# Patient Record
Sex: FEMALE | Race: WHITE | NOT HISPANIC OR LATINO | Employment: FULL TIME | URBAN - METROPOLITAN AREA
[De-identification: names, ages, dates, MRNs, and addresses within clinical notes are randomized per-mention and may not be internally consistent; named-entity substitution may affect disease eponyms.]

---

## 2017-03-27 ENCOUNTER — HOSPITAL ENCOUNTER (OUTPATIENT)
Dept: RADIOLOGY | Age: 67
Discharge: HOME | End: 2017-03-27
Payer: COMMERCIAL

## 2017-03-27 ENCOUNTER — GENERIC CONVERSION - ENCOUNTER (OUTPATIENT)
Dept: OTHER | Facility: OTHER | Age: 67
End: 2017-03-27

## 2017-03-27 DIAGNOSIS — Z13.820 ENCOUNTER FOR SCREENING FOR OSTEOPOROSIS: ICD-10-CM

## 2017-03-27 DIAGNOSIS — M81.0 AGE-RELATED OSTEOPOROSIS WITHOUT CURRENT PATHOLOGICAL FRACTURE: ICD-10-CM

## 2017-03-27 PROCEDURE — 77080 DXA BONE DENSITY AXIAL: CPT

## 2017-12-18 ENCOUNTER — ALLSCRIPTS OFFICE VISIT (OUTPATIENT)
Dept: OTHER | Facility: OTHER | Age: 67
End: 2017-12-18

## 2017-12-18 DIAGNOSIS — R63.4 ABNORMAL WEIGHT LOSS: ICD-10-CM

## 2017-12-19 NOTE — PROGRESS NOTES
Assessment  1  Acute bronchitis (466 0) (J20 9)   2  Abnormal weight loss (783 21) (R63 4)    Plan  Abnormal weight loss    · US PELVIS COMPLETE NON OB; Status:Hold For - Scheduling; Requestedfor:18Sck3665;   Acute bronchitis    · Azithromycin 250 MG Oral Tablet; TAKE 2 TABLETS ON DAY 1 THEN TAKE 1TABLET A DAY FOR 4 DAYS   · Dulera 100-5 MCG/ACT Inhalation Aerosol; INHALE 2 PUFFS AT 12 HOURINTERVALS (MORNING AND EVENING)   · Recheck if symptoms do not improve in 4-7 days Evaluation and Treatment  Follow-up Status: Complete  Done: 13LSF4044 12:28PM    Discussion/Summary  The patient was counseled regarding risk factor reductions,-- impressions  Possible side effects of new medications were reviewed with the patient/guardian today  The treatment plan was reviewed with the patient/guardian  The patient/guardian understands and agrees with the treatment plan      Chief Complaint  Coughing and congestion      History of Present Illness  HPI: Patient is here for symptoms of upper respiratory infection  she is also reporting weight loss  Patient states that she is not trying to lose weight, And has been eating healthy  her last Pap at 73 was reported normal  Denies any symptoms of postmenopausal spotting /bleeding / vaginal discharge  Reports mild lower abdomen bloating  Patient had a colonoscopy 4 years ago and was told that she has a polyp  Patient is not due for another colonoscopy till next year  She is up-to-date on her mammogram, And has not noticed any breast lumps  Sore Throat:   Jia Resenidz presents with complaints of sudden onset of constant episodes of moderate bilateral sore throat, described as dull, non-radiating  Episodes started 12 days ago  Symptoms are worsening  Associated symptoms include nasal congestion,-- postnasal drainage,-- headache,-- hoarseness-- and-- cough, but-- no fever,-- no chills-- and-- no ear pain  Review of Systems   Constitutional: as noted in HPI   ENT: as noted in HPI  Cardiovascular: no complaints of slow or fast heart rate, no chest pain, no palpitations, no leg claudication or lower extremity edema  Respiratory: as noted in HPI  Gastrointestinal: as noted in HPI  Active Problems  1  Age related osteoporosis (733 01) (M81 0)   2  Dizziness (780 4) (R42)   3  Dizziness and giddiness (780 4) (R42)   4  Encounter for screening for cardiovascular disorders (V81 2) (Z13 6)   5  Hypervitaminosis D (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 4)   6  Iron deficiency anemia (280 9) (D50 9)   7  Need for pneumococcal vaccination (V03 82) (Z23)   8  Osteoarthritis Of Hand (715 94)   9  Osteoporosis screening (V82 81) (Z13 820)   10  Visit for screening mammogram (V76 12) (Z12 31)    Past Medical History  1  History of De Quervain's tenosynovitis (727 04) (M65 4)   2  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)   3  History of Retinal Tear Of The Right Eye (361 30)  Active Problems And Past Medical History Reviewed: The active problems and past medical history were reviewed and updated today  Family History  Mother    1  Family history of Chronic Obstructive Pulmonary Disease   2  Family history of Coronary Artery Disease (V17 49)   3  Family history of Hypertension (V17 49)   4  Family history of Osteoporosis (V17 81)  Father    5  Family history of Emphysema  Brother    6  Family history of Antithrombin III Deficiency   7  Family history of Diverticulitis Of Colon   8  Family history of Hypertension (V17 49)    Social History   · Marital History -    · Never A Smoker  The social history was reviewed and updated today  Surgical History  1  History of Repair Of Retinal Detachment   2  History of Tonsillectomy   3  History of Tubal Ligation    Current Meds   1  Ferrous Sulfate 325 (65 Fe) MG Oral Tablet; one tablet twice / week; Therapy: 27EQI2768 to (Last Rx:53Ctm2376) Ordered    The medication list was reviewed and updated today  Allergies  1   Penicillins    Vitals   Recorded: 21QYK8501 10: 32AM   Temperature 98 1 F   Heart Rate 57   Respiration 16   Systolic 342   Diastolic 62   Height 5 ft 4 in   Weight 118 lb    BMI Calculated 20 25   BSA Calculated 1 56   O2 Saturation 97     Physical Exam   Constitutional  General appearance: No acute distress, well appearing and well nourished  Ears, Nose, Mouth, and Throat  Otoscopic examination: Tympanic membranes translucent with normal light reflex  Canals patent without erythema  Oropharynx: Abnormal   The posterior pharynx was erythematous  Pulmonary  Auscultation of lungs: Abnormal   bronchial breath sounds over the left base  Cardiovascular  Auscultation of heart: Normal rate and rhythm, normal S1 and S2, without murmurs  Examination of extremities for edema and/or varicosities: Normal    Abdomen  Abdomen: Non-tender, no masses  Liver and spleen: No hepatomegaly or splenomegaly  Results/Data  Falls Risk Assessment (Dx Z13 89 Screen for Neurologic Disorder) 38ABE4551 10:36AM User, [employer]     Test Name Result Flag Reference   Falls Risk      No falls in the past year     PHQ-2 Adult Depression Screening 73Fwu7910 10:36AM User, [employer]     Test Name Result Flag Reference   PHQ-2 Adult Depression Score 0     Over the last two weeks, how often have you been bothered by any of the following problems?  Little interest or pleasure in doing things: Not at all - 0 Feeling down, depressed, or hopeless: Not at all - 0   PHQ-2 Adult Depression Screening Negative         Signatures   Electronically signed by : Dora Francis MD; Dec 18 2017 12:29PM EST                       (Author)

## 2017-12-21 ENCOUNTER — HOSPITAL ENCOUNTER (OUTPATIENT)
Dept: ULTRASOUND IMAGING | Facility: HOSPITAL | Age: 67
Discharge: HOME | End: 2017-12-21
Payer: COMMERCIAL

## 2017-12-21 DIAGNOSIS — R63.4 ABNORMAL WEIGHT LOSS: ICD-10-CM

## 2017-12-21 PROCEDURE — 76830 TRANSVAGINAL US NON-OB: CPT

## 2017-12-21 PROCEDURE — 76856 US EXAM PELVIC COMPLETE: CPT

## 2017-12-26 ENCOUNTER — GENERIC CONVERSION - ENCOUNTER (OUTPATIENT)
Dept: OTHER | Facility: OTHER | Age: 67
End: 2017-12-26

## 2018-01-11 NOTE — RESULT NOTES
Verified Results  (Q) TSH, 3RD GENERATION 58Nfd4518 06:37AM Renee Delarosa   REPORT COMMENT:  FASTING:YES     Test Name Result Flag Reference   TSH 2 58 mIU/L  0 40-4 50     (1) LIPID PANEL, FASTING 27Wsv7655 06:37AM Renee Delarosa     Test Name Result Flag Reference   CHOLESTEROL, TOTAL 204 mg/dL H 125-200   HDL CHOLESTEROL 107 mg/dL  > OR = 46   TRIGLICERIDES 67 mg/dL  <021   LDL-CHOLESTEROL 84 mg/dL (calc)  <130   Desirable range <100 mg/dL for patients with CHD or  diabetes and <70 mg/dL for diabetic patients with  known heart disease  CHOL/HDLC RATIO 1 9 (calc)  < OR = 5 0   NON HDL CHOLESTEROL 97 mg/dL (calc)     Target for non-HDL cholesterol is 30 mg/dL higher than   LDL cholesterol target  (1) COMPREHENSIVE METABOLIC PANEL 86ITD1622 17:18RA Renee Delarosa     Test Name Result Flag Reference   GLUCOSE 83 mg/dL  65-99   Fasting reference interval   UREA NITROGEN (BUN) 14 mg/dL  7-25   CREATININE 0 81 mg/dL  0 50-0 99   For patients >52years of age, the reference limit  for Creatinine is approximately 13% higher for people  identified as -American  eGFR NON-AFR   AMERICAN 76 mL/min/1 73m2  > OR = 60   eGFR AFRICAN AMERICAN 88 mL/min/1 73m2  > OR = 60   BUN/CREATININE RATIO   4-29   NOT APPLICABLE (calc)   SODIUM 138 mmol/L  135-146   POTASSIUM 3 9 mmol/L  3 5-5 3   CHLORIDE 101 mmol/L     CARBON DIOXIDE 26 mmol/L  20-31   CALCIUM 9 5 mg/dL  8 6-10 4   PROTEIN, TOTAL 6 4 g/dL  6 1-8 1   ALBUMIN 4 6 g/dL  3 6-5 1   GLOBULIN 1 8 g/dL (calc) L 1 9-3 7   ALBUMIN/GLOBULIN RATIO 2 6 (calc) H 1 0-2 5   BILIRUBIN, TOTAL 1 2 mg/dL  0 2-1 2   ALKALINE PHOSPHATASE 75 U/L     AST 22 U/L  10-35   ALT 19 U/L  6-29     (1) CBC/PLT/DIFF 02YLC1319 06:37AM Renee Delarosa     Test Name Result Flag Reference   WHITE BLOOD CELL COUNT 4 5 Thousand/uL  3 8-10 8   RED BLOOD CELL COUNT 4 88 Million/uL  3 80-5 10   HEMOGLOBIN 13 9 g/dL  11 7-15 5   HEMATOCRIT 41 7 %  35 0-45 0   MCV 85 5 fL  80 0-100 0 MCH 28 6 pg  27 0-33 0   MCHC 33 4 g/dL  32 0-36 0   RDW 14 6 %  11 0-15 0   PLATELET COUNT 487 Thousand/uL  140-400   MPV 9 0 fL  7 5-11 5   ABSOLUTE NEUTROPHILS 2651 cells/uL  8549-4878   ABSOLUTE LYMPHOCYTES 1517 cells/uL  850-3900   ABSOLUTE MONOCYTES 216 cells/uL  200-950   ABSOLUTE EOSINOPHILS 104 cells/uL     ABSOLUTE BASOPHILS 14 cells/uL  0-200   NEUTROPHILS 58 9 %     LYMPHOCYTES 33 7 %     MONOCYTES 4 8 %     EOSINOPHILS 2 3 %     BASOPHILS 0 3 %

## 2018-01-14 NOTE — PROGRESS NOTES
Assessment    1  Encounter for preventive health examination (V70 0) (Z00 00)   2  Visit for screening mammogram (V76 12) (Z12 31)   3  Osteoporosis screening (V82 81) (Z13 820)   4  Need for pneumococcal vaccination (V03 82) (Z23)   5  Iron deficiency anemia (280 9) (D50 9)   6  Encounter for screening for cardiovascular disorders (V81 2) (Z13 6)    Plan  Age related osteoporosis, Osteoporosis screening    · * DXA BONE DENSITY SPINE HIP AND PELVIS; Status:Hold For - Scheduling;  Requested for:68Dcr7474;   Encounter for screening for cardiovascular disorders    · (1) LIPID PANEL, FASTING; Status:Active; Requested for:23Nka4391; Health Maintenance    · We encourage all of our patients to exercise regularly  30 minutes of exercise or physical  activity five or more days a week is recommended for children and adults ;  Status:Complete;   Done: 75FGO1694 01:46PM   · We recommend that you create an advance directive ; Status:Complete;   Done:  50PDW5175 01:46PM   · We recommend that you follow the "Mediterranean diet "; Status:Complete;   Done:  58UDG8367 01:46PM  Health Maintenance, Iron deficiency anemia    · (1) COMPREHENSIVE METABOLIC PANEL; Status:Active; Requested for:18Dtu9429;    · (Q) TSH, 3RD GENERATION; Status:Active; Requested for:83Npu2412;   Iron deficiency anemia    · Ferrous Sulfate 325 (65 Fe) MG Oral Tablet; one tablet twice / week   · (1) CBC/PLT/DIFF; Status:Active; Requested for:70Yoe4542;   Need for pneumococcal vaccination    · Prevnar 13 Intramuscular Suspension; as directed; To Be Done: 88PII7619  Visit for screening mammogram    · * MAMMO SCREENING BILATERAL W CAD; Status:Hold For - Scheduling; Requested  for:53Cak4252;     Discussion/Summary  health maintenance visit Currently, she eats a healthy diet and has an adequate exercise regimen   the risks and benefits of cervical cancer screening were discussed cervical cancer screening is current cervical cancer screening is needed every three years cervical cancer screening is managed by [facility] Breast cancer screening: the risks and benefits of breast cancer screening were discussed, mammogram has been ordered and breast cancer screening is managed by [facility]  Colorectal cancer screening: the risks and benefits of colorectal cancer screening were discussed, colorectal cancer screening is current and colorectal cancer screening is managed by Dr Young Doctor  The risks and benefits of immunizations were discussed and immunizations will be given as outlined in the orders  Advice and education were given regarding nutrition, aerobic exercise, calcium supplements and cardiovascular risk reduction  Patient advised basic labs to follow-up on her state of iron deficiency anemia  She will follow-up x 1 year/ sooner if labs are abnormal    The patient was counseled regarding instructions for management, risk factor reductions, patient and family education, impressions, importance of compliance with treatment  Chief Complaint  Well visit      History of Present Illness  HM, Adult Female: The patient is being seen for a health maintenance evaluation  The last health maintenance visit was 3 year(s) ago  General Health: The patient's health since the last visit is described as good  She has regular dental visits  She denies vision problems  She denies hearing loss  Lifestyle:  She consumes a diverse and healthy diet  She exercises regularly  She does not use tobacco  She denies alcohol use  Reproductive health: the patient is postmenopausal   15 yrs  Screening: Cervical cancer screening includes a pap smear performed 3 and Dr Agustin Rodgers, [facility]  Breast cancer screening includes a mammogram performed 2 YRS AGO  Colorectal cancer screening includes a colonoscopy performed within the past ten years and Dr Hector Hendricks,    metabolic screening reviewed and current     HPI: Patient is here for annual physical    She is currently on over-the-counter oral iron supplements for anemia which was diagnosed with her routine labs when she went to donate blood  Review of Systems    Constitutional: as noted in HPI  Eyes: No complaints of eye pain, no red eyes, no eyesight problems, no discharge, no dry eyes, no itching of eyes  ENT: no complaints of earache, no loss of hearing, no nose bleeds, no nasal discharge, no sore throat, no hoarseness  Cardiovascular: No complaints of slow heart rate, no fast heart rate, no chest pain, no palpitations, no leg claudication, no lower extremity edema  Respiratory: No complaints of shortness of breath, no wheezing, no cough, no SOB on exertion, no orthopnea, no PND  Gastrointestinal: No complaints of abdominal pain, no constipation, no nausea or vomiting, no diarrhea, no bloody stools  Genitourinary: No complaints of dysuria, no incontinence, no pelvic pain, no dysmenorrhea, no vaginal discharge or bleeding  Musculoskeletal: No complaints of arthralgias, no myalgias, no joint swelling or stiffness, no limb pain or swelling  Integumentary: No complaints of skin rash or lesions, no itching, no skin wounds, no breast pain or lump  Neurological: No complaints of headache, no confusion, no convulsions, no numbness, no dizziness or fainting, no tingling, no limb weakness, no difficulty walking  Psychiatric: Not suicidal, no sleep disturbance, no anxiety or depression, no change in personality, no emotional problems  Endocrine: No complaints of proptosis, no hot flashes, no muscle weakness, no deepening of the voice, no feelings of weakness  Hematologic/Lymphatic: H/o Anemia  Preventive Quality 65 and Older: Falls Risk: The patient fell 0 times in the past 12 months  The patient currently has no urinary incontinence symptoms  Glaucoma Screen: Date of last glaucoma screen was, 2015  Active Problems    1  Dizziness (780 4) (R42)   2  Dizziness and giddiness (780 4) (R42)   3  Hypervitaminosis D (278 4)   4   Osteoarthritis Of Hand (960 94)    Past Medical History    · History of De Quervain's tenosynovitis (727 04) (M65 4)   · 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)   · History of Retinal Tear Of The Right Eye (361 30)    Surgical History    · History of Repair Of Retinal Detachment   · History of Tonsillectomy   · History of Tubal Ligation    Family History  Mother    · Family history of Chronic Obstructive Pulmonary Disease   · Family history of Coronary Artery Disease (V17 49)   · Family history of Hypertension (V17 49)   · Family history of Osteoporosis (V17 81)  Father    · Family history of Emphysema  Brother    · Family history of Antithrombin III Deficiency   · Family history of Diverticulitis Of Colon   · Family history of Hypertension (V17 49)    Social History    · Marital History -    · Never A Smoker    Current Meds   1  No Reported Medications Recorded    Allergies    1  Penicillins    Vitals   Recorded: 83RQM2958 12:59PM   Heart Rate 66   Respiration 14   Systolic 708   Diastolic 62   Weight 139 lb      Physical Exam    Constitutional   General appearance: No acute distress, well appearing and well nourished  Ears, Nose, Mouth, and Throat   Otoscopic examination: Tympanic membranes translucent with normal light reflex  Canals patent without erythema  Oropharynx: Normal with no erythema, edema, exudate or lesions  Neck   Neck: Supple, symmetric, trachea midline, no masses  Pulmonary   Auscultation of lungs: Clear to auscultation  Cardiovascular   Auscultation of heart: Normal rate and rhythm, normal S1 and S2, no murmurs  Examination of extremities for edema and/or varicosities: Normal     Abdomen   Abdomen: Non-tender, no masses  Liver and spleen: No hepatomegaly or splenomegaly  Lymphatic   Palpation of lymph nodes in neck: No lymphadenopathy  Musculoskeletal   Digits and nails: Normal without clubbing or cyanosis      Joints, bones, and muscles: Normal     Stability: Normal  Muscle strength/tone: Normal     Neurologic   Cortical function: Normal mental status  Coordination: Normal finger to nose and heel to shin      Psychiatric   Orientation to person, place, and time: Normal     Mood and affect: Normal        Signatures   Electronically signed by : Amador Cardozo MD; Dec  7 2016  1:46PM EST                       (Author)

## 2018-01-17 NOTE — RESULT NOTES
Verified Results  * MAMMO SCREENING BILATERAL W CAD [id number] 10:50AM Stephie Promise Order Number: [id number]    - Patient Instructions: To schedule this appointment, please contact Central Scheduling at [phone]  Do not wear any perfume, powder, lotion or deodorant on breast or underarm area  Please bring your doctors order, referral (if needed) and insurance information with you on the day of the test  Failure to bring this information may result in this test being rescheduled  Arrive 15 minutes prior to your appointment time to register  On the day of your test, please bring any prior mammogram or breast studies with you that were not performed at a [facility]  Failure to bring prior exams may result in your test needing to be rescheduled  Test Name Result Flag Reference   MAMMO SCREENING BILATERAL W CAD (Report)     Patient History:   Patient is postmenopausal and had first child at age 29  Family history of colorectal cancer in paternal uncle and breast    cancer in paternal aunt at age [de-identified]  Patient has never smoked  Patient's BMI is 21 8  Reason for exam: screening (asymptomatic)  Mammo Screening Bilateral W CAD: December 8, 2016 - Check In #:    [de-identified]   Bilateral MLO, CC, and XCCL view(s) were taken  Technologist: RT Penny(R)(M)   Prior study comparison: December 8, 2014, digital bilateral    screening mammogram performed at [address] [place] [address]  December 2, 2013, digital bilateral screening mammogram performed   at [address] [place] [address]  October 7, 2011, digital    bilateral screening mammogram performed at [address] [place] [address]  October 1, 2010, digital bilateral screening mammogram    performed at [address] [place] [address]  June 1, 2009,    bilateral SLNBIC digtl mitch scrn mammo performed at [address] [place] [address]  There are scattered fibroglandular densities   No new dominant    soft tissue mass, architectural distortion or suspicious    calcifications are noted  The skin and nipple contours are    within normal limits  No evidence of malignancy  No significant changes   when compared with prior studies  ASSESSMENT: BiRad:1 - Negative     Recommendation:   Routine screening mammogram of both breasts in 1 year  A    reminder letter will be scheduled  Analyzed by CAD     8-10% of cancers will be missed on mammography  Management of a    palpable abnormality must be based on clinical grounds  Patients   will be notified of their results via letter from our facility  Accredited by [employer] of Radiology and FDA  Transcription Location: [facility] 98: [id number]     Risk Value(s):   Tyrer-Cuzick 10 Year: 4 591%, Tyrer-Cuzick Lifetime: 9 468%,    Myriad Table: 1 5%, KAREN 5 Year: 2 3%, NCI Lifetime: 8 6%   Signed by:   Pepe Cobos MD   12/8/16       Discussion/Summary   Pam Lugo your mammogram has been reported normal  Please schedule a follow-up if you have any questions or concerns with the results

## 2018-01-23 VITALS
RESPIRATION RATE: 16 BRPM | TEMPERATURE: 98.1 F | DIASTOLIC BLOOD PRESSURE: 62 MMHG | HEIGHT: 64 IN | SYSTOLIC BLOOD PRESSURE: 108 MMHG | WEIGHT: 118 LBS | BODY MASS INDEX: 20.14 KG/M2 | HEART RATE: 57 BPM | OXYGEN SATURATION: 97 %

## 2018-01-23 NOTE — RESULT NOTES
Verified Results  * [facility] [id number] 03:44PM Guadalupe Thomson Order Number: [id number]    - Patient Instructions: To schedule this appointment, please contact Central Scheduling at [phone]  Test Name Result Flag Reference   US PELVIS COMPLETE (TRANSABDOMINAL AND TRANSVAGINAL) (Report)     PELVIC ULTRASOUND, COMPLETE     INDICATION: R63 4: Abnormal weight loss  History taken directly from the electronic ordering system  COMPARISON: None  TECHNIQUE:  Transabdominal pelvic ultrasound was performed in sagittal and transverse planes with a curvilinear transducer  Additional transvaginal imaging was performed to better evaluate the endometrium and ovaries  Imaging included volumetric    sweeps as well as traditional still imaging technique  FINDINGS:     UTERUS:   The uterus is anteverted in position, measuring 6 x 2 6 x 3 2 cm  Contour and echotexture appear normal      The cervix shows no suspicious abnormality  ENDOMETRIUM:    Normal caliber of 1 mm  Small amount of fluid within the endometrial canal      OVARIES/ADNEXA:   Limited evaluation of the ovaries due to bowel gas artifact  Right ovary: Normal size  No suspicious right ovarian abnormality  Doppler flow within normal limits  Left ovary: Normal size  No suspicious left ovarian abnormality  Doppler flow within normal limits  No suspicious adnexal mass or loculated collections  There is no free fluid  IMPRESSION:      Small amount of nonspecific endometrial canal fluid  Limited evaluation of the ovaries due to bowel gas artifact  No evidence of sonographic mass identified  No evidence of fluid collection         Workstation performed: [id number]     Signed by:   Bo Skiff, MD   12/24/17

## 2019-08-22 ENCOUNTER — OFFICE VISIT (OUTPATIENT)
Dept: FAMILY MEDICINE CLINIC | Facility: CLINIC | Age: 69
End: 2019-08-22
Payer: COMMERCIAL

## 2019-08-22 VITALS
HEART RATE: 63 BPM | RESPIRATION RATE: 16 BRPM | TEMPERATURE: 98.3 F | SYSTOLIC BLOOD PRESSURE: 118 MMHG | BODY MASS INDEX: 20.18 KG/M2 | HEIGHT: 64 IN | DIASTOLIC BLOOD PRESSURE: 70 MMHG | WEIGHT: 118.2 LBS | OXYGEN SATURATION: 96 %

## 2019-08-22 DIAGNOSIS — Z00.00 ANNUAL PHYSICAL EXAM: Primary | ICD-10-CM

## 2019-08-22 DIAGNOSIS — H00.011 HORDEOLUM EXTERNUM OF RIGHT UPPER EYELID: ICD-10-CM

## 2019-08-22 DIAGNOSIS — Z23 ENCOUNTER FOR VACCINATION: ICD-10-CM

## 2019-08-22 DIAGNOSIS — Z11.59 ENCOUNTER FOR HEPATITIS C SCREENING TEST FOR LOW RISK PATIENT: ICD-10-CM

## 2019-08-22 DIAGNOSIS — M81.0 AGE-RELATED OSTEOPOROSIS WITHOUT CURRENT PATHOLOGICAL FRACTURE: ICD-10-CM

## 2019-08-22 DIAGNOSIS — Z12.31 ENCOUNTER FOR SCREENING MAMMOGRAM FOR BREAST CANCER: ICD-10-CM

## 2019-08-22 PROCEDURE — 99397 PER PM REEVAL EST PAT 65+ YR: CPT | Performed by: FAMILY MEDICINE

## 2019-08-22 PROCEDURE — 90732 PPSV23 VACC 2 YRS+ SUBQ/IM: CPT

## 2019-08-22 PROCEDURE — 1160F RVW MEDS BY RX/DR IN RCRD: CPT | Performed by: FAMILY MEDICINE

## 2019-08-22 PROCEDURE — 90471 IMMUNIZATION ADMIN: CPT

## 2019-08-22 RX ORDER — MELATONIN
1000 DAILY
COMMUNITY

## 2019-08-22 RX ORDER — CHLORAL HYDRATE 500 MG
1000 CAPSULE ORAL DAILY
COMMUNITY

## 2019-08-22 RX ORDER — POLYMYXIN B SULFATE AND TRIMETHOPRIM 1; 10000 MG/ML; [USP'U]/ML
SOLUTION OPHTHALMIC
Qty: 10 ML | Refills: 0 | Status: SHIPPED | OUTPATIENT
Start: 2019-08-22 | End: 2020-02-27

## 2019-08-22 RX ORDER — FERROUS SULFATE 325(65) MG
325 TABLET ORAL DAILY
COMMUNITY
Start: 2016-12-07

## 2019-08-22 RX ORDER — RIBOFLAVIN (VITAMIN B2) 100 MG
500 TABLET ORAL 2 TIMES DAILY
COMMUNITY

## 2019-08-22 NOTE — PROGRESS NOTES
Assessment/Plan:       Problem List Items Addressed This Visit        Musculoskeletal and Integument    Age related osteoporosis  Reviewed weight bearing exercise, calcium and vit D supplementation  Has had normal TSH  Due for dexa    Relevant Orders    DXA bone density spine hip and pelvis      Other Visit Diagnoses     Annual physical exam    -  Primary - Reviewed health maintenance/preventive care  Discussed healthy diet and exercise/activity as able  Reviewed appropriate vaccinations  Screening labwork ordered      Relevant Orders    Comprehensive metabolic panel    Lipid Panel with Direct LDL reflex    Encounter for vaccination        Relevant Orders    PNEUMOCOCCAL POLYSACCHARIDE VACCINE 23-VALENT =>1YO SQ IM (Completed)    Encounter for hepatitis C screening test for low risk patient        Relevant Orders    Hepatitis C antibody    Encounter for screening mammogram for breast cancer        Relevant Orders    Mammo screening bilateral w 3d & cad                 Subjective:     Samanta Romero is a 77 y o  female here today and has the below chronic conditions:    Patient Active Problem List   Diagnosis    Age related osteoporosis     Current Outpatient Medications   Medication Sig Dispense Refill    Ascorbic Acid (VITAMIN C) 100 MG tablet Take 500 mg by mouth 2 (two) times a day      Calcium Citrate 150 MG CAPS Take 150 mg by mouth daily      cholecalciferol (VITAMIN D3) 1,000 units tablet Take 1,000 Units by mouth daily      co-enzyme Q-10 30 MG capsule Take 30 mg by mouth 3 (three) times a day      Cranberry 1000 MG CAPS Take 1,000 mg by mouth daily      ferrous sulfate 325 (65 Fe) mg tablet Take 325 tablets by mouth daily      Omega-3 Fatty Acids (FISH OIL) 1,000 mg Take 1,000 mg by mouth daily       No current facility-administered medications for this visit  HPI:  Chief Complaint   Patient presents with    Physical Exam     - CC above per clinical staff and reviewed      Pt here for annual PE  Overall has been very healthy    Stye on the R upper lid for the past two weeks now  Using warm compresses and warm teabags with some relief but still present  No vision changes    No daily meds besides vitamins  colonsocopy last year- due in 5 years    Due for mammo and dexa    +exercise including weight bearing  Healthy diet  Normal BMs  No GI concerns    The following portions of the patient's history were reviewed and updated as appropriate: allergies, current medications, past family history, past medical history, past social history, past surgical history and problem list     ROS:  Review of Systems   No fever, chills, congestion, chest pain, shortness of breath, nausea, vomiting, diarrhea, constipation, blood in stool, urinary concerns, mood changes  Rest of ROS neg except as above  Objective:      /70 (BP Location: Left arm)   Pulse 63   Temp 98 3 °F (36 8 °C)   Resp 16   Ht 5' 3 62" (1 616 m)   Wt 53 6 kg (118 lb 3 2 oz)   SpO2 96%   BMI 20 53 kg/m²   BP Readings from Last 3 Encounters:   08/22/19 118/70   12/18/17 108/62   12/07/16 110/62     Wt Readings from Last 3 Encounters:   08/22/19 53 6 kg (118 lb 3 2 oz)   12/18/17 53 5 kg (118 lb)   12/07/16 58 1 kg (128 lb)               Physical Exam:   Physical Exam   Constitutional: She is oriented to person, place, and time  She appears well-developed and well-nourished  HENT:   Head: Normocephalic and atraumatic  Eyes: Conjunctivae are normal    Neck: Neck supple  Cardiovascular: Normal rate, regular rhythm and normal heart sounds  No murmur heard  Pulmonary/Chest: Effort normal and breath sounds normal  No respiratory distress  She has no wheezes  Abdominal: Soft  There is no tenderness  There is no rebound and no guarding  Musculoskeletal: She exhibits no edema  Lymphadenopathy:     She has no cervical adenopathy  Neurological: She is alert and oriented to person, place, and time  Skin: Skin is warm and dry  Psychiatric: She has a normal mood and affect  Her behavior is normal    Nursing note and vitals reviewed

## 2019-08-22 NOTE — PATIENT INSTRUCTIONS

## 2019-08-30 ENCOUNTER — APPOINTMENT (OUTPATIENT)
Dept: LAB | Facility: CLINIC | Age: 69
End: 2019-08-30
Payer: COMMERCIAL

## 2019-08-30 DIAGNOSIS — Z11.59 ENCOUNTER FOR HEPATITIS C SCREENING TEST FOR LOW RISK PATIENT: ICD-10-CM

## 2019-08-30 DIAGNOSIS — Z00.00 ANNUAL PHYSICAL EXAM: ICD-10-CM

## 2019-08-30 LAB
ALBUMIN SERPL BCP-MCNC: 3.9 G/DL (ref 3.5–5)
ALP SERPL-CCNC: 75 U/L (ref 46–116)
ALT SERPL W P-5'-P-CCNC: 28 U/L (ref 12–78)
ANION GAP SERPL CALCULATED.3IONS-SCNC: 9 MMOL/L (ref 4–13)
AST SERPL W P-5'-P-CCNC: 22 U/L (ref 5–45)
BILIRUB SERPL-MCNC: 0.8 MG/DL (ref 0.2–1)
BUN SERPL-MCNC: 16 MG/DL (ref 5–25)
CALCIUM SERPL-MCNC: 9.3 MG/DL (ref 8.3–10.1)
CHLORIDE SERPL-SCNC: 103 MMOL/L (ref 100–108)
CHOLEST SERPL-MCNC: 226 MG/DL (ref 50–200)
CO2 SERPL-SCNC: 30 MMOL/L (ref 21–32)
CREAT SERPL-MCNC: 0.71 MG/DL (ref 0.6–1.3)
GFR SERPL CREATININE-BSD FRML MDRD: 88 ML/MIN/1.73SQ M
GLUCOSE P FAST SERPL-MCNC: 100 MG/DL (ref 65–99)
HCV AB SER QL: NORMAL
HDLC SERPL-MCNC: 113 MG/DL (ref 40–60)
LDLC SERPL CALC-MCNC: 105 MG/DL (ref 0–100)
POTASSIUM SERPL-SCNC: 3.9 MMOL/L (ref 3.5–5.3)
PROT SERPL-MCNC: 7.1 G/DL (ref 6.4–8.2)
SODIUM SERPL-SCNC: 142 MMOL/L (ref 136–145)
TRIGL SERPL-MCNC: 41 MG/DL

## 2019-08-30 PROCEDURE — 86803 HEPATITIS C AB TEST: CPT

## 2019-08-30 PROCEDURE — 80053 COMPREHEN METABOLIC PANEL: CPT

## 2019-08-30 PROCEDURE — 80061 LIPID PANEL: CPT

## 2019-08-30 PROCEDURE — 36415 COLL VENOUS BLD VENIPUNCTURE: CPT

## 2019-10-16 ENCOUNTER — HOSPITAL ENCOUNTER (OUTPATIENT)
Dept: RADIOLOGY | Age: 69
Discharge: HOME | End: 2019-10-16
Payer: COMMERCIAL

## 2019-10-16 VITALS — BODY MASS INDEX: 20.73 KG/M2 | WEIGHT: 117 LBS | HEIGHT: 63 IN

## 2019-10-16 DIAGNOSIS — Z12.31 ENCOUNTER FOR SCREENING MAMMOGRAM FOR BREAST CANCER: ICD-10-CM

## 2019-10-16 DIAGNOSIS — M81.0 AGE-RELATED OSTEOPOROSIS WITHOUT CURRENT PATHOLOGICAL FRACTURE: ICD-10-CM

## 2019-10-16 PROCEDURE — 77067 SCR MAMMO BI INCL CAD: CPT

## 2019-10-16 PROCEDURE — 77063 BREAST TOMOSYNTHESIS BI: CPT

## 2019-10-16 PROCEDURE — 77080 DXA BONE DENSITY AXIAL: CPT

## 2020-02-27 ENCOUNTER — APPOINTMENT (OUTPATIENT)
Dept: LAB | Facility: CLINIC | Age: 70
End: 2020-02-27
Payer: COMMERCIAL

## 2020-02-27 ENCOUNTER — OFFICE VISIT (OUTPATIENT)
Dept: FAMILY MEDICINE CLINIC | Facility: CLINIC | Age: 70
End: 2020-02-27
Payer: COMMERCIAL

## 2020-02-27 VITALS
SYSTOLIC BLOOD PRESSURE: 116 MMHG | HEART RATE: 66 BPM | TEMPERATURE: 98 F | WEIGHT: 118 LBS | HEIGHT: 63 IN | BODY MASS INDEX: 20.91 KG/M2 | OXYGEN SATURATION: 98 % | DIASTOLIC BLOOD PRESSURE: 80 MMHG | RESPIRATION RATE: 16 BRPM

## 2020-02-27 DIAGNOSIS — R19.7 DIARRHEA, UNSPECIFIED TYPE: ICD-10-CM

## 2020-02-27 DIAGNOSIS — R19.7 DIARRHEA, UNSPECIFIED TYPE: Primary | ICD-10-CM

## 2020-02-27 LAB
ALBUMIN SERPL BCP-MCNC: 3.6 G/DL (ref 3.5–5)
ALP SERPL-CCNC: 69 U/L (ref 46–116)
ALT SERPL W P-5'-P-CCNC: 35 U/L (ref 12–78)
ANION GAP SERPL CALCULATED.3IONS-SCNC: 9 MMOL/L (ref 4–13)
AST SERPL W P-5'-P-CCNC: 25 U/L (ref 5–45)
BASOPHILS # BLD AUTO: 0.02 THOUSANDS/ΜL (ref 0–0.1)
BASOPHILS NFR BLD AUTO: 1 % (ref 0–1)
BILIRUB SERPL-MCNC: 1.24 MG/DL (ref 0.2–1)
BUN SERPL-MCNC: 15 MG/DL (ref 5–25)
CALCIUM SERPL-MCNC: 9 MG/DL (ref 8.3–10.1)
CHLORIDE SERPL-SCNC: 105 MMOL/L (ref 100–108)
CO2 SERPL-SCNC: 29 MMOL/L (ref 21–32)
CREAT SERPL-MCNC: 0.65 MG/DL (ref 0.6–1.3)
EOSINOPHIL # BLD AUTO: 0.04 THOUSAND/ΜL (ref 0–0.61)
EOSINOPHIL NFR BLD AUTO: 1 % (ref 0–6)
ERYTHROCYTE [DISTWIDTH] IN BLOOD BY AUTOMATED COUNT: 13.1 % (ref 11.6–15.1)
GFR SERPL CREATININE-BSD FRML MDRD: 91 ML/MIN/1.73SQ M
GLUCOSE SERPL-MCNC: 93 MG/DL (ref 65–140)
HCT VFR BLD AUTO: 42.9 % (ref 34.8–46.1)
HGB BLD-MCNC: 14.6 G/DL (ref 11.5–15.4)
IMM GRANULOCYTES # BLD AUTO: 0.01 THOUSAND/UL (ref 0–0.2)
IMM GRANULOCYTES NFR BLD AUTO: 0 % (ref 0–2)
LYMPHOCYTES # BLD AUTO: 0.96 THOUSANDS/ΜL (ref 0.6–4.47)
LYMPHOCYTES NFR BLD AUTO: 22 % (ref 14–44)
MCH RBC QN AUTO: 30.2 PG (ref 26.8–34.3)
MCHC RBC AUTO-ENTMCNC: 34 G/DL (ref 31.4–37.4)
MCV RBC AUTO: 89 FL (ref 82–98)
MONOCYTES # BLD AUTO: 0.52 THOUSAND/ΜL (ref 0.17–1.22)
MONOCYTES NFR BLD AUTO: 12 % (ref 4–12)
NEUTROPHILS # BLD AUTO: 2.78 THOUSANDS/ΜL (ref 1.85–7.62)
NEUTS SEG NFR BLD AUTO: 64 % (ref 43–75)
NRBC BLD AUTO-RTO: 0 /100 WBCS
PLATELET # BLD AUTO: 172 THOUSANDS/UL (ref 149–390)
PMV BLD AUTO: 10 FL (ref 8.9–12.7)
POTASSIUM SERPL-SCNC: 3.9 MMOL/L (ref 3.5–5.3)
PROT SERPL-MCNC: 6.7 G/DL (ref 6.4–8.2)
RBC # BLD AUTO: 4.83 MILLION/UL (ref 3.81–5.12)
SODIUM SERPL-SCNC: 143 MMOL/L (ref 136–145)
WBC # BLD AUTO: 4.33 THOUSAND/UL (ref 4.31–10.16)

## 2020-02-27 PROCEDURE — 1036F TOBACCO NON-USER: CPT | Performed by: FAMILY MEDICINE

## 2020-02-27 PROCEDURE — 1160F RVW MEDS BY RX/DR IN RCRD: CPT | Performed by: FAMILY MEDICINE

## 2020-02-27 PROCEDURE — 99214 OFFICE O/P EST MOD 30 MIN: CPT | Performed by: FAMILY MEDICINE

## 2020-02-27 PROCEDURE — 4040F PNEUMOC VAC/ADMIN/RCVD: CPT | Performed by: FAMILY MEDICINE

## 2020-02-27 PROCEDURE — 3008F BODY MASS INDEX DOCD: CPT | Performed by: FAMILY MEDICINE

## 2020-02-27 PROCEDURE — 80053 COMPREHEN METABOLIC PANEL: CPT

## 2020-02-27 PROCEDURE — 36415 COLL VENOUS BLD VENIPUNCTURE: CPT

## 2020-02-27 PROCEDURE — 85025 COMPLETE CBC W/AUTO DIFF WBC: CPT

## 2020-02-27 RX ORDER — DICYCLOMINE HYDROCHLORIDE 10 MG/1
CAPSULE ORAL
Qty: 20 CAPSULE | Refills: 0 | Status: SHIPPED | OUTPATIENT
Start: 2020-02-27 | End: 2021-12-02 | Stop reason: ALTCHOICE

## 2020-02-27 NOTE — PATIENT INSTRUCTIONS
Acute Diarrhea   AMBULATORY CARE:   Acute diarrhea  starts quickly and lasts a short time, usually 1 to 3 days  It can last up to 2 weeks  Signs and symptoms that may happen with diarrhea:  You may have 3 or more episodes of diarrhea  It may be hard to control your diarrhea  You may also have any of the following:  · Fever and chills    · Headache or abdominal pain    · Nausea and vomiting    · Symptoms of dehydration such as thirst, decreased urination, dry skin, sunken eyes, or fast, pounding heartbeat  Seek care immediately if:   · You feel confused  · Your heartbeat is faster than normal      · Your eyes look deeply sunken, or you have no tears when you cry  · You urinate less than usual, or your urine is dark yellow  · You have blood or mucus in your stools  · You have severe abdominal pain  · You are unable to drink any liquids  Contact your healthcare provider if:  · Your symptoms do not get better with treatment  · You have a fever higher than 101 3°F (38 5°C)  · You have trouble eating and drinking because you are vomiting  · You are thirsty or have a dry mouth  · Your diarrhea does not get better in 7 days  · You have questions or concerns about your condition or care  Treatment for acute diarrhea  may include medicines to slow or stop your diarrhea  You may also need medicine to treat an infection  Self-care:   · Drink liquids as directed  Liquids will help prevent dehydration caused by diarrhea  Ask your healthcare provider how much liquid to drink each day and which liquids are best for you  You may need to drink an oral rehydration solution (ORS)  An ORS has the right amounts of water, salts, and sugar you need to replace body fluids  You can buy an ORS at most grocery stores and pharmacies  · Eat foods that are easy to digest   Examples include rice, lentils, cereal, bananas, potatoes, and bread   It also includes some fruits (bananas, melon), well-cooked vegetables, and lean meats  Avoid foods high in fiber, fat, and sugar  Also avoid caffeine, alcohol, dairy, and red meat until your diarrhea is gone  Prevent diarrhea:   · Wash your hands often  Use soap and water  Wash your hands before you eat or prepare food  Also wash your hands after you use the bathroom  Use an alcohol-based hand gel when soap and water are not available  · Keep bathroom surfaces clean  This helps prevent the spread of germs that cause acute diarrhea  · Wash fruits and vegetables well before you eat them  This can help remove germs that cause diarrhea  If possible, remove the skin from fruits and vegetables, or cook them well before you eat them  · Cook meat as directed  ¨ Cook ground meat  to 160°F      ¨ Cook ground poultry, whole poultry, or cuts of poultry  to at least 165°F  Remove the meat from heat  Let it stand for 3 minutes before you eat it  ¨ Cook whole cuts of meat other than poultry  to at least 145°F  Remove the meat from heat  Let it stand for 3 minutes before you eat it  · Wash dishes that have touched raw meat with hot water and soap  This includes cutting boards, utensils, dishes, and serving containers  · Place raw or cooked meat in the refrigerator as soon as possible  Bacteria can grow in meat that is left at room temperature too long  · Do not eat raw or undercooked oysters, clams, or mussels  These foods may be contaminated and cause infection  · Drink filtered or treated water only when you travel  Do not put ice in your drinks  Drink bottled water whenever possible  Follow up with your healthcare provider as directed:  Write down your questions so you remember to ask them during your visits  © 2017 [employer] Information is for End User's use only and may not be sold, redistributed or otherwise used for commercial purposes   All illustrations and images included in CareNotes® are the copyrighted property of [employer]  or [employer]  The above information is an  only  It is not intended as medical advice for individual conditions or treatments  Talk to your doctor, nurse or pharmacist before following any medical regimen to see if it is safe and effective for you  Nutrition Tips for Relief of Diarrhea   WHAT YOU NEED TO KNOW:   There are diet changes you can make to help relieve or stop diarrhea  These changes include limiting or avoiding foods and liquids that are high in sugar, fat, fiber, and lactose  Lactose is a sugar found in milk products  Milk products can cause diarrhea in people who are lactose intolerant  You should also drink extra liquids to replace fluids that are lost when you have diarrhea  Diarrhea can lead to dehydration  DISCHARGE INSTRUCTIONS:   Foods to limit or avoid:   · Dairy:      ¨ Whole milk    ¨ Half-and-half, cream, and sour cream    ¨ Regular (whole milk) ice cream    · Grains:      ¨ Whole wheat and whole grain breads, pasta, cereals, and crackers    ¨ Brown and wild rice    ¨ Breads and cereals with seeds or nuts    ¨ Popcorn    · Fruit and vegetables:      ¨ All raw fruits, except bananas and melon    ¨ Dried fruits, including prunes and raisins    ¨ Canned fruit in heavy syrup    ¨ Prune juice and any fruit juice with pulp    ¨ Raw vegetables, except lettuce     ¨ Fried vegetables    ¨ Corn, raw and cooked broccoli, cabbage, cauliflower, and lorene greens    · Protein:      ¨ Fried meat, poultry, and fish    ¨ High-fat luncheon meats, such as bologna    ¨ Fatty meats, such as sausage, yap, and hot dogs    ¨ Beans and nuts    · Liquids:      ¨ Sodas and fruit-flavored drinks    ¨ Drinks that contain caffeine, such as energy drinks, coffee, and tea     ¨ Drinks that contain alcohol or sugar alcohol, such as sorbitol  Foods and liquids you may eat or drink:  Most people can tolerate the foods and liquids listed below   If any of them make your symptoms worse, stop eating or drinking them until you feel better  If you are lactose intolerant, avoid milk products  · Dairy:      ¨ Skim or low-fat milk or evaporated milk    ¨ Soy milk or buttermilk     ¨ Low-fat, part-skim, and aged cheese    ¨ Yogurt, low-fat ice cream, or sherbert    · Grains:  (Choose foods with less than 2 grams of dietary fiber per serving )     ¨ White or refined flour breads, bagels, pasta, and crackers    ¨ Cold or hot cereals made from white or refined flour such as puffed rice, cornflakes, or cream of wheat    ¨ White rice    · Fruit and vegetables:      ¨ Bananas or melon    ¨ Fruit juice without pulp, except prune juice    ¨ Canned fruit in juice or light syrup    ¨ Lettuce and most well-cooked vegetables without seeds or skins     ¨ Strained vegetable juice    · Protein:      ¨ Tender, well-cooked meat, poultry, or fish    ¨ Well-cooked eggs or soy foods (cooked without added fat)    ¨ Smooth nut butters    · Fats:  (Limit fats to less than 8 teaspoons a day)     ¨ Oil, butter, or margarine, or mayonnaise    ¨ Cream cheese or salad dressings    · Liquids:      ¨ For infants, breast milk or formula    ¨ Oral rehydration solution     ¨ Decaffeinated coffee or caffeine-free teas    ¨ Soft drinks without caffeine  Other guidelines to follow:   · Drink liquids as directed  You may need to drink more liquids than usual to prevent dehydration  Ask how much liquid to drink each day and which liquids are best for you  You may need to drink an oral rehydration solution (ORS)  An ORS helps replace fluids and electrolytes that you lose when you have diarrhea  · Eat small meals or snacks every 3 to 4 hours  instead of large meals  Continue eating even if you still have diarrhea  Your diarrhea will continue for a few days but should gradually go away    © 2017 [employer] Information is for End User's use only and may not be sold, redistributed or otherwise used for commercial purposes  All illustrations and images included in CareNotes® are the copyrighted property of [employer]  or Judah Garcia  The above information is an  only  It is not intended as medical advice for individual conditions or treatments  Talk to your doctor, nurse or pharmacist before following any medical regimen to see if it is safe and effective for you

## 2020-02-27 NOTE — PROGRESS NOTES
Assessment/Plan:       Problem List Items Addressed This Visit     None      Visit Diagnoses     Diarrhea, unspecified type    -  Primary    Relevant Medications    dicyclomine (BENTYL) 10 mg capsule    Other Relevant Orders    CBC and differential    Comprehensive metabolic panel      suspect prolonged viral diarrhea  rx bentyl prn cramping  Check cbc and cmp  Reviewed hydration, bland diet, etc  Note for work, return on Monday assuming sx are improved  Discussed stool studies- will wait a few more days to see if sx begin to improve  No recent abx use or travel  Subjective:     Brando Unger is a 72 y o  female here today with chief complaint below:  Chief Complaint   Patient presents with    Diarrhea     x4 days  Has tried brat diet and has not helped   Abdominal Pain     has slight cramping but not always   Headache     has a headache but thinks its from not eating well     - CC above per clinical staff and reviewed  HPI:    Diarrhea started on Monday  Many times since Monday  Waking at night to have diarrhea  Very watery stools  Trying to eat bland foods- soup, rice, etc   Doesn't slow down  Saw blood x 1 on toilet paper which she attributes to hemorrhoid  Abdominal cramping when she is having fecal urgency  Having BMs 3-4 times over night and then 8 times during the day  Fever- none  No vomiting, no nausea  Some belching  No ill contacts  Does have cold symptoms since Sunday- sneezing, congestion  No cough  Drinking smart water through the day- about 32 oz a day    No recent abx use  No recent travel      Some HA  No lightheadedness or dizziness  Doesn't feel very sick, just concerned that the diarrhea persists    The following portions of the patient's history were reviewed and updated as appropriate: allergies, current medications, past family history, past medical history, past social history, past surgical history and problem list     ROS:  Review of Systems   No fever, chills,  chest pain, shortness of breath, nausea, vomiting,  constipation, blood in stool, urinary concerns, mood changes  Rest of ROS neg except as above  Objective:      /80   Pulse 66   Temp 98 °F (36 7 °C) (Tympanic)   Resp 16   Ht 5' 3" (1 6 m)   Wt 53 5 kg (118 lb)   SpO2 98%   BMI 20 90 kg/m²   BP Readings from Last 3 Encounters:   02/27/20 116/80   08/22/19 118/70   12/18/17 108/62     Wt Readings from Last 3 Encounters:   02/27/20 53 5 kg (118 lb)   10/16/19 53 1 kg (117 lb)   08/22/19 53 6 kg (118 lb 3 2 oz)               Physical Exam:   Physical Exam   Constitutional: She is oriented to person, place, and time  She appears well-developed and well-nourished  No distress  HENT:   Head: Normocephalic and atraumatic  Moist mucous membranes   Neck: Neck supple  Cardiovascular: Normal rate, regular rhythm and normal heart sounds  No murmur heard  Pulmonary/Chest: Effort normal and breath sounds normal  No respiratory distress  She has no wheezes  Abdominal: Soft  Bowel sounds are normal  There is no tenderness  There is no rebound and no guarding  Musculoskeletal: She exhibits no edema  Lymphadenopathy:     She has no cervical adenopathy  Neurological: She is alert and oriented to person, place, and time  Skin: Skin is warm and dry  Psychiatric: She has a normal mood and affect  Her behavior is normal    Nursing note and vitals reviewed  Matteo Lind

## 2020-03-02 ENCOUNTER — TELEPHONE (OUTPATIENT)
Dept: FAMILY MEDICINE CLINIC | Facility: CLINIC | Age: 70
End: 2020-03-02

## 2020-03-02 DIAGNOSIS — R19.7 DIARRHEA, UNSPECIFIED TYPE: Primary | ICD-10-CM

## 2020-03-02 NOTE — TELEPHONE ENCOUNTER
Flako Dhiraj is calling because she is still having diarrhea even with the medication  It is not as frequent, but she would like to know what would be the next step  Please leave a voice mail message if she does not answer        Caller:Bettina  Phone#:[phone]

## 2020-03-03 NOTE — TELEPHONE ENCOUNTER
Placed call to patient and left detailed message (okay per communication sheet) informing patient that labs were ordered and she can stop by at the lab to  supplies  If she has any questions she can give us a call back

## 2020-03-05 ENCOUNTER — TELEPHONE (OUTPATIENT)
Dept: FAMILY MEDICINE CLINIC | Facility: CLINIC | Age: 70
End: 2020-03-05

## 2020-03-05 NOTE — TELEPHONE ENCOUNTER
Patient returned call, questioning price of stool testing as she heard it is costly  Reviewed price inquiry in [employer] and [employer] but no pricing information  Advised best way would be to contact her insurance company for an idea of coverage  Per patient she is not having fevers or abdominal pain  As of yesterday morning her stool was formed, and patient reports  feeling great  Patient did not have a BM during the day at all however in the afternoon she felt like she may have to have a BM but did not  Between the hours of 1600 through 1900 last night patient had 4 episodes, starting as formed but moved to loose as the episodes went on  No BM after 1900 last night and today she has not had a BM  Patient reports feeling great today, no bloating abd pain or pressure  Patient is asking if she can hold off on the stool testing due to possible cost to see if she is finally making a turn  Patient is on the Cream.HR and wanted to know if she can start introducing new foods to her diet at this time  Will route to provider

## 2020-03-05 NOTE — TELEPHONE ENCOUNTER
It sounds like she is recovering- once she has gone 24 hours without a loose stool- ok to slowly advance diet  No need to get any testing done if she is improving now

## 2020-03-05 NOTE — TELEPHONE ENCOUNTER
Leanna Carolynn is returning Mercedes's call concerning her labs and fecal cultures      Please call her back    Caller:Bettina  Phone#:[phone]

## 2020-03-06 NOTE — TELEPHONE ENCOUNTER
LATE ENTRY DUE TO DOWNTIME-    Patient was called at [phone] on 03/05/2020, advised Dr Cecilia Perales follow up instructions  Patient understood and will notify our office if the loose stools return

## 2020-08-21 RX ORDER — ESTRADIOL 0.1 MG/G
CREAM VAGINAL
COMMUNITY
Start: 2020-05-12 | End: 2021-12-02 | Stop reason: ALTCHOICE

## 2020-09-14 ENCOUNTER — OFFICE VISIT (OUTPATIENT)
Dept: FAMILY MEDICINE CLINIC | Facility: CLINIC | Age: 70
End: 2020-09-14
Payer: COMMERCIAL

## 2020-09-14 VITALS
RESPIRATION RATE: 12 BRPM | WEIGHT: 118.2 LBS | OXYGEN SATURATION: 97 % | DIASTOLIC BLOOD PRESSURE: 66 MMHG | BODY MASS INDEX: 20.94 KG/M2 | HEART RATE: 71 BPM | HEIGHT: 63 IN | SYSTOLIC BLOOD PRESSURE: 112 MMHG | TEMPERATURE: 98.7 F

## 2020-09-14 DIAGNOSIS — M79.81 ACHENBACH'S SYNDROME: ICD-10-CM

## 2020-09-14 DIAGNOSIS — Z12.31 SCREENING MAMMOGRAM, ENCOUNTER FOR: ICD-10-CM

## 2020-09-14 DIAGNOSIS — Z86.2 HX OF IRON DEFICIENCY ANEMIA: ICD-10-CM

## 2020-09-14 DIAGNOSIS — Z00.00 ANNUAL PHYSICAL EXAM: Primary | ICD-10-CM

## 2020-09-14 DIAGNOSIS — M81.0 AGE-RELATED OSTEOPOROSIS WITHOUT CURRENT PATHOLOGICAL FRACTURE: ICD-10-CM

## 2020-09-14 PROBLEM — N32.81 OAB (OVERACTIVE BLADDER): Status: ACTIVE | Noted: 2020-05-06

## 2020-09-14 PROBLEM — N81.2 UTEROVAGINAL PROLAPSE, INCOMPLETE: Status: ACTIVE | Noted: 2019-06-27

## 2020-09-14 PROCEDURE — 1036F TOBACCO NON-USER: CPT | Performed by: FAMILY MEDICINE

## 2020-09-14 PROCEDURE — 1160F RVW MEDS BY RX/DR IN RCRD: CPT | Performed by: FAMILY MEDICINE

## 2020-09-14 PROCEDURE — 3288F FALL RISK ASSESSMENT DOCD: CPT | Performed by: FAMILY MEDICINE

## 2020-09-14 PROCEDURE — 99397 PER PM REEVAL EST PAT 65+ YR: CPT | Performed by: FAMILY MEDICINE

## 2020-09-14 PROCEDURE — 3725F SCREEN DEPRESSION PERFORMED: CPT | Performed by: FAMILY MEDICINE

## 2020-09-14 PROCEDURE — 1101F PT FALLS ASSESS-DOCD LE1/YR: CPT | Performed by: FAMILY MEDICINE

## 2020-09-14 NOTE — PATIENT INSTRUCTIONS
Check on coverage for the Shingrix vaccine  It is two doses  You can call for a nurse visit to get this done here or if it is covered at the pharmacy, get it there  Return for a nurse visit after 10/1/20 for your flu vaccine  Call with any concerns/changes  Have a happy Halloween!

## 2020-09-14 NOTE — PROGRESS NOTES
Assessment/Plan:       Problem List Items Addressed This Visit        Musculoskeletal and Integument    Age related osteoporosis    Relevant Orders    TSH, 3rd generation with Free T4 reflex    Achenbach's syndrome     Reassured- benign recurrent spontaneous subq bleeding in fingers           Other Visit Diagnoses     Annual physical exam    -  Primary- Reviewed health maintenance/preventive care  Discussed healthy diet and exercise/activity as able    Reviewed appropriate vaccinations- flu after 10/1, shingrix (pt to check on coverage)  Screening labwork ordered    Relevant Orders    Comprehensive metabolic panel    Lipid Panel with Direct LDL reflex    Screening mammogram, encounter for        Relevant Orders    Mammo screening bilateral w 3d & cad    Hx of iron deficiency anemia        Relevant Orders    CBC and differential    Ferritin                   Subjective:     Soha Arzola is a 72 y o  female here today and has the below chronic conditions:    Patient Active Problem List   Diagnosis    Age related osteoporosis    OAB (overactive bladder)    Osteoarthrosis, hand    Uterovaginal prolapse, incomplete     Current Outpatient Medications   Medication Sig Dispense Refill    Ascorbic Acid (VITAMIN C) 100 MG tablet Take 500 mg by mouth 2 (two) times a day      Calcium Citrate 150 MG CAPS Take 150 mg by mouth daily      cholecalciferol (VITAMIN D3) 1,000 units tablet Take 1,000 Units by mouth daily      co-enzyme Q-10 30 MG capsule Take 30 mg by mouth 3 (three) times a day      Cranberry 1000 MG CAPS Take 1,000 mg by mouth daily      dicyclomine (BENTYL) 10 mg capsule Take one up to four times daily for abdominal cramping 20 capsule 0    estradiol (ESTRACE) 0 1 mg/g vaginal cream Use 1/2 g (pea sized amount) vaginally daily at night for 2 weeks followed by 2-3 times at night weekly      ferrous sulfate 325 (65 Fe) mg tablet Take 325 tablets by mouth daily      Omega-3 Fatty Acids (FISH OIL) 1,000 mg Take 1,000 mg by mouth daily       No current facility-administered medications for this visit  HPI:  Chief Complaint   Patient presents with    Physical Exam     B/L brusing of the hands intermittent     Immunizations     Shingrex      - CC above per clinical staff and reviewed  Pt here for annual PE  Following with urogyn- prolapsed uterus  May need hysterectomy  No cp or sob  No GI problems  UTD on colonoscopy  Due for mammo next month- would like rx  Mood has been overall good- some stress w/ covid pandemic  Coping well  No recent illnesses/infections  Does note that she has had a few isolated episodes of spontaneous blood vessel breaking in fingers- one finger at a time  No inciting event  Resolves after 2 d  The following portions of the patient's history were reviewed and updated as appropriate: allergies, current medications, past family history, past medical history, past social history, past surgical history and problem list     ROS:  Review of Systems   No fever, chills, congestion, chest pain, shortness of breath, nausea, vomiting, diarrhea, constipation, blood in stool,  mood changes  Rest of ROS neg except as above  Objective:      /66   Pulse 71   Temp 98 7 °F (37 1 °C)   Resp 12   Ht 5' 3" (1 6 m)   Wt 53 6 kg (118 lb 3 2 oz)   SpO2 97%   BMI 20 94 kg/m²   BP Readings from Last 3 Encounters:   09/14/20 112/66   02/27/20 116/80   08/22/19 118/70     Wt Readings from Last 3 Encounters:   09/14/20 53 6 kg (118 lb 3 2 oz)   02/27/20 53 5 kg (118 lb)   10/16/19 53 1 kg (117 lb)               Physical Exam:   Physical Exam  Vitals signs and nursing note reviewed  Constitutional:       General: She is not in acute distress  Appearance: Normal appearance  She is well-developed  HENT:      Head: Normocephalic and atraumatic  Eyes:      Conjunctiva/sclera: Conjunctivae normal    Neck:      Musculoskeletal: Neck supple        Vascular: No carotid bruit  Cardiovascular:      Rate and Rhythm: Normal rate and regular rhythm  Heart sounds: Normal heart sounds  No murmur  Pulmonary:      Effort: Pulmonary effort is normal  No respiratory distress  Breath sounds: Normal breath sounds  No wheezing  Abdominal:      Palpations: Abdomen is soft  Tenderness: There is no abdominal tenderness  There is no guarding or rebound  Musculoskeletal:      Right lower leg: No edema  Left lower leg: No edema  Lymphadenopathy:      Cervical: No cervical adenopathy  Skin:     General: Skin is warm and dry  Neurological:      Mental Status: She is alert and oriented to person, place, and time     Psychiatric:         Mood and Affect: Mood normal          Behavior: Behavior normal

## 2020-09-22 ENCOUNTER — TRANSCRIBE ORDERS (OUTPATIENT)
Dept: LAB | Facility: CLINIC | Age: 70
End: 2020-09-22

## 2020-09-22 ENCOUNTER — APPOINTMENT (OUTPATIENT)
Dept: LAB | Facility: CLINIC | Age: 70
End: 2020-09-22
Payer: COMMERCIAL

## 2020-09-22 DIAGNOSIS — Z00.00 ANNUAL PHYSICAL EXAM: ICD-10-CM

## 2020-09-22 DIAGNOSIS — M81.0 AGE-RELATED OSTEOPOROSIS WITHOUT CURRENT PATHOLOGICAL FRACTURE: ICD-10-CM

## 2020-09-22 DIAGNOSIS — Z86.2 HX OF IRON DEFICIENCY ANEMIA: ICD-10-CM

## 2020-09-22 LAB
ALBUMIN SERPL BCP-MCNC: 4 G/DL (ref 3.5–5)
ALP SERPL-CCNC: 69 U/L (ref 46–116)
ALT SERPL W P-5'-P-CCNC: 24 U/L (ref 12–78)
ANION GAP SERPL CALCULATED.3IONS-SCNC: 9 MMOL/L (ref 4–13)
AST SERPL W P-5'-P-CCNC: 18 U/L (ref 5–45)
BASOPHILS # BLD AUTO: 0.02 THOUSANDS/ΜL (ref 0–0.1)
BASOPHILS NFR BLD AUTO: 0 % (ref 0–1)
BILIRUB SERPL-MCNC: 0.94 MG/DL (ref 0.2–1)
BUN SERPL-MCNC: 13 MG/DL (ref 5–25)
CALCIUM SERPL-MCNC: 9.2 MG/DL (ref 8.3–10.1)
CHLORIDE SERPL-SCNC: 105 MMOL/L (ref 100–108)
CHOLEST SERPL-MCNC: 224 MG/DL (ref 50–200)
CO2 SERPL-SCNC: 28 MMOL/L (ref 21–32)
CREAT SERPL-MCNC: 0.67 MG/DL (ref 0.6–1.3)
EOSINOPHIL # BLD AUTO: 0.23 THOUSAND/ΜL (ref 0–0.61)
EOSINOPHIL NFR BLD AUTO: 5 % (ref 0–6)
ERYTHROCYTE [DISTWIDTH] IN BLOOD BY AUTOMATED COUNT: 14.1 % (ref 11.6–15.1)
FERRITIN SERPL-MCNC: 19 NG/ML (ref 8–388)
GFR SERPL CREATININE-BSD FRML MDRD: 90 ML/MIN/1.73SQ M
GLUCOSE P FAST SERPL-MCNC: 89 MG/DL (ref 65–99)
HCT VFR BLD AUTO: 42.5 % (ref 34.8–46.1)
HDLC SERPL-MCNC: 109 MG/DL
HGB BLD-MCNC: 13.9 G/DL (ref 11.5–15.4)
IMM GRANULOCYTES # BLD AUTO: 0.01 THOUSAND/UL (ref 0–0.2)
IMM GRANULOCYTES NFR BLD AUTO: 0 % (ref 0–2)
LDLC SERPL CALC-MCNC: 104 MG/DL (ref 0–100)
LYMPHOCYTES # BLD AUTO: 1.56 THOUSANDS/ΜL (ref 0.6–4.47)
LYMPHOCYTES NFR BLD AUTO: 33 % (ref 14–44)
MCH RBC QN AUTO: 29.1 PG (ref 26.8–34.3)
MCHC RBC AUTO-ENTMCNC: 32.7 G/DL (ref 31.4–37.4)
MCV RBC AUTO: 89 FL (ref 82–98)
MONOCYTES # BLD AUTO: 0.35 THOUSAND/ΜL (ref 0.17–1.22)
MONOCYTES NFR BLD AUTO: 8 % (ref 4–12)
NEUTROPHILS # BLD AUTO: 2.51 THOUSANDS/ΜL (ref 1.85–7.62)
NEUTS SEG NFR BLD AUTO: 54 % (ref 43–75)
NRBC BLD AUTO-RTO: 0 /100 WBCS
PLATELET # BLD AUTO: 220 THOUSANDS/UL (ref 149–390)
PMV BLD AUTO: 9.5 FL (ref 8.9–12.7)
POTASSIUM SERPL-SCNC: 4.2 MMOL/L (ref 3.5–5.3)
PROT SERPL-MCNC: 6.6 G/DL (ref 6.4–8.2)
RBC # BLD AUTO: 4.77 MILLION/UL (ref 3.81–5.12)
SODIUM SERPL-SCNC: 142 MMOL/L (ref 136–145)
TRIGL SERPL-MCNC: 54 MG/DL
TSH SERPL DL<=0.05 MIU/L-ACNC: 2.71 UIU/ML (ref 0.36–3.74)
WBC # BLD AUTO: 4.68 THOUSAND/UL (ref 4.31–10.16)

## 2020-09-22 PROCEDURE — 36415 COLL VENOUS BLD VENIPUNCTURE: CPT

## 2020-09-22 PROCEDURE — 84443 ASSAY THYROID STIM HORMONE: CPT

## 2020-09-22 PROCEDURE — 82728 ASSAY OF FERRITIN: CPT

## 2020-09-22 PROCEDURE — 80053 COMPREHEN METABOLIC PANEL: CPT

## 2020-09-22 PROCEDURE — 80061 LIPID PANEL: CPT

## 2020-09-22 PROCEDURE — 85025 COMPLETE CBC W/AUTO DIFF WBC: CPT

## 2020-10-01 ENCOUNTER — CLINICAL SUPPORT (OUTPATIENT)
Dept: FAMILY MEDICINE CLINIC | Facility: CLINIC | Age: 70
End: 2020-10-01
Payer: COMMERCIAL

## 2020-10-01 DIAGNOSIS — Z23 NEED FOR VACCINATION: Primary | ICD-10-CM

## 2020-10-01 PROCEDURE — 90471 IMMUNIZATION ADMIN: CPT

## 2020-10-01 PROCEDURE — 90662 IIV NO PRSV INCREASED AG IM: CPT

## 2020-11-17 ENCOUNTER — TELEPHONE (OUTPATIENT)
Dept: FAMILY MEDICINE CLINIC | Facility: CLINIC | Age: 70
End: 2020-11-17

## 2020-12-08 ENCOUNTER — OFFICE VISIT (OUTPATIENT)
Dept: FAMILY MEDICINE CLINIC | Facility: CLINIC | Age: 70
End: 2020-12-08
Payer: COMMERCIAL

## 2020-12-08 VITALS
HEART RATE: 63 BPM | TEMPERATURE: 97.5 F | DIASTOLIC BLOOD PRESSURE: 72 MMHG | RESPIRATION RATE: 12 BRPM | WEIGHT: 188 LBS | HEIGHT: 64 IN | SYSTOLIC BLOOD PRESSURE: 120 MMHG | OXYGEN SATURATION: 97 % | BODY MASS INDEX: 32.1 KG/M2

## 2020-12-08 DIAGNOSIS — N81.2 UTEROVAGINAL PROLAPSE, INCOMPLETE: ICD-10-CM

## 2020-12-08 DIAGNOSIS — Z01.818 PRE-OPERATIVE EXAMINATION: Primary | ICD-10-CM

## 2020-12-08 PROCEDURE — 99243 OFF/OP CNSLTJ NEW/EST LOW 30: CPT | Performed by: FAMILY MEDICINE

## 2020-12-08 PROCEDURE — 1036F TOBACCO NON-USER: CPT | Performed by: FAMILY MEDICINE

## 2020-12-08 PROCEDURE — 1160F RVW MEDS BY RX/DR IN RCRD: CPT | Performed by: FAMILY MEDICINE

## 2020-12-08 PROCEDURE — 3008F BODY MASS INDEX DOCD: CPT | Performed by: FAMILY MEDICINE

## 2021-03-19 ENCOUNTER — TRANSCRIBE ORDERS (OUTPATIENT)
Dept: LAB | Facility: CLINIC | Age: 71
End: 2021-03-19

## 2021-03-23 ENCOUNTER — HOSPITAL ENCOUNTER (OUTPATIENT)
Dept: RADIOLOGY | Age: 71
Discharge: HOME | End: 2021-03-23
Payer: COMMERCIAL

## 2021-03-23 VITALS — WEIGHT: 117 LBS | HEIGHT: 64 IN | BODY MASS INDEX: 19.97 KG/M2

## 2021-03-23 DIAGNOSIS — Z12.31 ENCOUNTER FOR SCREENING MAMMOGRAM FOR MALIGNANT NEOPLASM OF BREAST: ICD-10-CM

## 2021-03-23 DIAGNOSIS — Z12.31 SCREENING MAMMOGRAM, ENCOUNTER FOR: ICD-10-CM

## 2021-03-23 PROCEDURE — 77063 BREAST TOMOSYNTHESIS BI: CPT

## 2021-03-23 PROCEDURE — 77067 SCR MAMMO BI INCL CAD: CPT

## 2021-04-07 ENCOUNTER — TELEPHONE (OUTPATIENT)
Dept: FAMILY MEDICINE CLINIC | Facility: CLINIC | Age: 71
End: 2021-04-07

## 2021-04-07 NOTE — TELEPHONE ENCOUNTER
Pt received 1st Gat[employer] Jane on 3/10/21   Lot# [id number]    2nd [employer] Jane received 4/1/2021  Lot # [id number]    Left deltoid

## 2021-11-03 ENCOUNTER — IMMUNIZATIONS (OUTPATIENT)
Dept: FAMILY MEDICINE CLINIC | Facility: CLINIC | Age: 71
End: 2021-11-03
Payer: COMMERCIAL

## 2021-11-03 DIAGNOSIS — Z23 ENCOUNTER FOR IMMUNIZATION: Primary | ICD-10-CM

## 2021-11-03 PROCEDURE — 90662 IIV NO PRSV INCREASED AG IM: CPT

## 2021-11-03 PROCEDURE — G0008 ADMIN INFLUENZA VIRUS VAC: HCPCS

## 2021-12-02 ENCOUNTER — OFFICE VISIT (OUTPATIENT)
Dept: FAMILY MEDICINE CLINIC | Facility: CLINIC | Age: 71
End: 2021-12-02
Payer: COMMERCIAL

## 2021-12-02 VITALS
OXYGEN SATURATION: 98 % | DIASTOLIC BLOOD PRESSURE: 70 MMHG | BODY MASS INDEX: 19.97 KG/M2 | RESPIRATION RATE: 16 BRPM | HEART RATE: 51 BPM | HEIGHT: 64 IN | TEMPERATURE: 97.6 F | WEIGHT: 117 LBS | SYSTOLIC BLOOD PRESSURE: 118 MMHG

## 2021-12-02 DIAGNOSIS — Z13.220 SCREENING CHOLESTEROL LEVEL: ICD-10-CM

## 2021-12-02 DIAGNOSIS — M81.0 AGE-RELATED OSTEOPOROSIS WITHOUT CURRENT PATHOLOGICAL FRACTURE: ICD-10-CM

## 2021-12-02 DIAGNOSIS — Z86.2 HX OF IRON DEFICIENCY ANEMIA: ICD-10-CM

## 2021-12-02 DIAGNOSIS — Z00.00 MEDICARE ANNUAL WELLNESS VISIT, SUBSEQUENT: Primary | ICD-10-CM

## 2021-12-02 PROCEDURE — G0439 PPPS, SUBSEQ VISIT: HCPCS | Performed by: FAMILY MEDICINE

## 2021-12-15 ENCOUNTER — TELEPHONE (OUTPATIENT)
Dept: FAMILY MEDICINE CLINIC | Facility: CLINIC | Age: 71
End: 2021-12-15

## 2022-05-10 ENCOUNTER — HOSPITAL ENCOUNTER (OUTPATIENT)
Dept: RADIOLOGY | Age: 72
Discharge: HOME | End: 2022-05-10
Payer: COMMERCIAL

## 2022-05-10 DIAGNOSIS — M81.0 AGE-RELATED OSTEOPOROSIS WITHOUT CURRENT PATHOLOGICAL FRACTURE: ICD-10-CM

## 2022-05-10 PROCEDURE — 77080 DXA BONE DENSITY AXIAL: CPT

## 2022-09-20 ENCOUNTER — OFFICE VISIT (OUTPATIENT)
Dept: FAMILY MEDICINE CLINIC | Facility: CLINIC | Age: 72
End: 2022-09-20
Payer: COMMERCIAL

## 2022-09-20 VITALS
HEART RATE: 64 BPM | SYSTOLIC BLOOD PRESSURE: 124 MMHG | OXYGEN SATURATION: 98 % | HEIGHT: 64 IN | TEMPERATURE: 98 F | BODY MASS INDEX: 20.49 KG/M2 | WEIGHT: 120 LBS | DIASTOLIC BLOOD PRESSURE: 80 MMHG

## 2022-09-20 DIAGNOSIS — F41.9 ANXIETY: Primary | ICD-10-CM

## 2022-09-20 DIAGNOSIS — Z86.2 HX OF IRON DEFICIENCY ANEMIA: ICD-10-CM

## 2022-09-20 DIAGNOSIS — Z13.220 SCREENING CHOLESTEROL LEVEL: ICD-10-CM

## 2022-09-20 PROCEDURE — 99214 OFFICE O/P EST MOD 30 MIN: CPT | Performed by: FAMILY MEDICINE

## 2022-09-20 NOTE — PROGRESS NOTES
Assessment/Plan:       Problem List Items Addressed This Visit        Other    Anxiety - Primary     Primarily situational in nature  She prefers not to take medication or speak to a therapist at this point  We discussed working on self-care, allowing herself time to grieve, leaning on others for support where she can, doing things that lessen her anxiety and help her relax  She feels this will help her and she knows to reach out if she is feeling worse or has concerns at all  Relevant Orders    Comprehensive metabolic panel    TSH, 3rd generation with Free T4 reflex    CBC and differential      Other Visit Diagnoses     Screening cholesterol level    - ordered to have done prior to annual PE    Relevant Orders    Comprehensive metabolic panel    Lipid Panel with Direct LDL reflex    Hx of iron deficiency anemia    - check prior to annual PE    Relevant Orders    CBC and differential               Subjective:     Padmini Haro is a 71 y o  female here today with chief complaint below:  Chief Complaint   Patient presents with    Anxiety     Feeling like she has to take a lot of deep breathes and head fog  Patient has been having financial stress and loss of a pet  - CC above per clinical staff and reviewed  HPI:  Pt here with concern for anxiety  Financial stresses, cat (22 years old) passed away recently- cat was declining significantly in health and was actively dying- needed to be euthanized  This has been hard  Stresses in the world right now worry her  Finds she has to stop and take some deep breaths to settle herself down  No panic attacks  Prefers not to take medicine for this and she doesn't want to see a therapist at this time  Feels its just something she will need to work through  Was worried that her BP might be high    Sleep is overall good- used to wake up often due to her cat, now trying to get back in a sleep cycle again  Does not feel depressed    No thoughts of self-harm  ? The following portions of the patient's history were reviewed and updated as appropriate: allergies, current medications, past family history, past medical history, past social history, past surgical history and problem list     ROS:  Review of Systems       Objective:      /80   Pulse 64   Temp 98 °F (36 7 °C)   Ht 5' 3 78" (1 62 m)   Wt 54 4 kg (120 lb)   SpO2 98%   BMI 20 74 kg/m²   BP Readings from Last 3 Encounters:   09/20/22 124/80   12/02/21 118/70   12/08/20 120/72     Wt Readings from Last 3 Encounters:   09/20/22 54 4 kg (120 lb)   12/02/21 53 1 kg (117 lb)   03/23/21 53 1 kg (117 lb)               Physical Exam:   Physical Exam  Vitals and nursing note reviewed  Constitutional:       Appearance: Normal appearance  She is well-developed  She is not ill-appearing  HENT:      Head: Normocephalic  Cardiovascular:      Rate and Rhythm: Normal rate and regular rhythm  Heart sounds: Normal heart sounds  No murmur heard  Pulmonary:      Effort: Pulmonary effort is normal  No respiratory distress  Breath sounds: Normal breath sounds  No wheezing  Skin:     General: Skin is warm and dry  Neurological:      Mental Status: She is alert and oriented to person, place, and time     Psychiatric:         Behavior: Behavior normal       Comments: Mildly anxious

## 2022-09-21 PROBLEM — F41.9 ANXIETY: Status: ACTIVE | Noted: 2022-09-21

## 2022-09-21 NOTE — ASSESSMENT & PLAN NOTE
Primarily situational in nature  She prefers not to take medication or speak to a therapist at this point  We discussed working on self-care, allowing herself time to grieve, leaning on others for support where she can, doing things that lessen her anxiety and help her relax  She feels this will help her and she knows to reach out if she is feeling worse or has concerns at all

## 2022-12-05 ENCOUNTER — OFFICE VISIT (OUTPATIENT)
Dept: FAMILY MEDICINE CLINIC | Facility: CLINIC | Age: 72
End: 2022-12-05

## 2022-12-05 VITALS
BODY MASS INDEX: 20.49 KG/M2 | OXYGEN SATURATION: 99 % | HEIGHT: 64 IN | SYSTOLIC BLOOD PRESSURE: 138 MMHG | HEART RATE: 72 BPM | WEIGHT: 120 LBS | TEMPERATURE: 97.3 F | DIASTOLIC BLOOD PRESSURE: 80 MMHG

## 2022-12-05 DIAGNOSIS — Z00.00 MEDICARE ANNUAL WELLNESS VISIT, SUBSEQUENT: Primary | ICD-10-CM

## 2022-12-05 RX ORDER — FERROUS SULFATE 325(65) MG
325 TABLET ORAL 3 TIMES WEEKLY
Status: SHIPPED
Start: 2022-12-05

## 2022-12-05 RX ORDER — CALCIUM/D3/MAG/K2/MIN/HERB 326 333-32 MG
2 TABLET ORAL 2 TIMES DAILY
COMMUNITY

## 2022-12-05 RX ORDER — AMOXICILLIN 500 MG
2 CAPSULE ORAL DAILY
COMMUNITY

## 2022-12-05 RX ORDER — FERROUS SULFATE 325(65) MG
325 TABLET ORAL
COMMUNITY
End: 2022-12-05

## 2022-12-05 NOTE — PROGRESS NOTES
Assessment and Plan:     Problem List Items Addressed This Visit    None  Visit Diagnoses     Medicare annual wellness visit, subsequent    -  Primary           Preventive health issues were discussed with patient, and age appropriate screening tests were ordered as noted in patient's After Visit Summary  Personalized health advice and appropriate referrals for health education or preventive services given if needed, as noted in patient's After Visit Summary  Reviewed recent labs  Continue healthy diet and activity level  Follow up in one year  She will schedule mammogram  Osteoporosis- she will repeat in 2 years, avoiding fall risks, keeping active, taking otc supplements and would like to see how these work for bone density  Tried fosamax in the past with GI upset  She declines another medication at this point       History of Present Illness:     Patient presents for a Medicare Wellness Visit    HPI   Patient Care Team:  Hussein Lao MD as PCP - General (Family Medicine)  Hussein Lao MD as PCP - [facility]CP-[facility]     Review of Systems:     Review of Systems     Problem List:     Patient Active Problem List   Diagnosis   • Age related osteoporosis   • OAB (overactive bladder)   • Osteoarthrosis, hand   • Uterovaginal prolapse, incomplete   • Achenbach's syndrome   • Anxiety      Past Medical and Surgical History:     Past Medical History:   Diagnosis Date   • De Quervain's tenosynovitis    • Osteoporosis    • Puckering of macula, right eye    • Retinal tear of right eye      Past Surgical History:   Procedure Laterality Date   • EYE SURGERY     • RETINAL DETACHMENT SURGERY     • TONSILLECTOMY     • TUBAL LIGATION     • WRIST SURGERY        Family History:     Family History   Problem Relation Age of Onset   • COPD Mother    • Coronary artery disease Mother    • Hypertension Mother    • Osteoporosis Mother    • Emphysema Father    • Antithrombin III deficiency Brother    • Diverticulitis Brother    • Hypertension Brother    • No Known Problems Sister    • No Known Problems Daughter    • No Known Problems Maternal Grandmother    • No Known Problems Maternal Grandfather    • No Known Problems Paternal Grandmother    • No Known Problems Paternal Grandfather    • Colon cancer Paternal Uncle    • Breast cancer Paternal [de-identified]   • No Known Problems Paternal Aunt    • No Known Problems Paternal Aunt    • No Known Problems Paternal Aunt    • No Known Problems Paternal Aunt    • No Known Problems Paternal Aunt    • No Known Problems Paternal Aunt    • No Known Problems Paternal Aunt    • No Known Problems Maternal Aunt    • No Known Problems Maternal Aunt       Social History:     Social History     Socioeconomic History   • Marital status:      Spouse name: None   • Number of children: 2   • Years of education: None   • Highest education level: None   Occupational History   • None   Tobacco Use   • Smoking status: Never   • Smokeless tobacco: Never   Vaping Use   • Vaping Use: Never used   Substance and Sexual Activity   • Alcohol use: Yes   • Drug use: Never   • Sexual activity: Not Currently   Other Topics Concern   • None   Social History Narrative   • None     Social Determinants of Health     Financial Resource Strain: Low Risk    • Difficulty of Paying Living Expenses: Not very hard   Food Insecurity: Not on file   Transportation Needs: No Transportation Needs   • Lack of Transportation (Medical): No   • Lack of Transportation (Non-Medical):  No   Physical Activity: Not on file   Stress: Not on file   Social Connections: Not on file   Intimate Partner Violence: Not on file   Housing Stability: Not on file      Medications and Allergies:     Current Outpatient Medications   Medication Sig Dispense Refill   • Ascorbic Acid (VITAMIN C) 100 MG tablet Take 500 mg by mouth 2 (two) times a day     • cholecalciferol (VITAMIN D3) 1,000 units tablet Take 1,000 Units by mouth daily     • ferrous sulfate 325 (65 Fe) mg tablet Take 1 tablet (325 mg total) by mouth 3 (three) times a week     • Multiple Vitamins-Minerals (Algae Based Calcium) TABS Take 2 capsules by mouth 2 (two) times a day (Calcium 360mg, Magnesium 175mg, Vitamin C 25mg, Vitamin D 800IU) (Stronium Boost 680mg qhs)     • Omega-3 Fatty Acids (Fish Oil) 1200 MG CAPS Take 2 capsules by mouth in the morning       No current facility-administered medications for this visit  Allergies   Allergen Reactions   • Alendronate Abdominal Pain     Fosamax   • Other Other (See Comments)     Scratchy throat   • Penicillins Rash      Immunizations:     Immunization History   Administered Date(s) Administered   • COVID-19 PFIZER VACCINE 0 3 ML IM 04/01/2020, 04/01/2020, 03/10/2021, 10/18/2021   • COVID-19 Pfizer Vac BIVALENT Braxton-sucrose 12 Yr+ IM (BOOSTER ONLY) 11/03/2022   • COVID-19 Pfizer vac (Braxton-sucrose, gray cap) 12 yr+ IM 05/19/2022   • INFLUENZA 11/06/2006, 11/26/2007, 12/06/2010, 11/01/2011, 10/12/2019   • Influenza Quadrivalent, 6-35 Months IM 09/01/2016, 10/18/2017   • Influenza, high dose seasonal 0 7 mL 10/01/2020, 11/03/2021   • Influenza, seasonal, injectable 10/31/2013, 10/06/2022   • Pneumococcal Conjugate 13-Valent 12/07/2016   • Pneumococcal Polysaccharide PPV23 08/22/2019   • TD (adult) Preservative Free 10/31/2013   • Td (adult), adsorbed 11/06/2006   • Tdap 09/21/2018      Health Maintenance:         Topic Date Due   • Breast Cancer Screening: Mammogram  03/23/2022   • Colorectal Cancer Screening  09/18/2023   • Hepatitis C Screening  Completed         Topic Date Due   • Hepatitis B Vaccine (1 of 3 - 3-dose series) Never done      Medicare Screening Tests and Risk Assessments:         Health Risk Assessment:   Patient rates overall health as very good  Patient feels that their physical health rating is same  Patient is satisfied with their life  Eyesight was rated as slightly worse  Hearing was rated as same   Patient feels that their emotional and mental health rating is same  Patients states they are never, rarely angry  Patient states they are never, rarely unusually tired/fatigued  Pain experienced in the last 7 days has been some  Patient's pain rating has been 1/10  Patient states that she has experienced no weight loss or gain in last 6 months  Fall Risk Screening: In the past year, patient has experienced: no history of falling in past year      Urinary Incontinence Screening:   Patient has not leaked urine accidently in the last six months  Home Safety:  Patient does not have trouble with stairs inside or outside of their home  Patient has working smoke alarms and has no working carbon monoxide detector  Home safety hazards include: none  Nutrition:   Current diet is Regular  Medications:   Patient is currently taking over-the-counter supplements  OTC medications include: Vitamins and AlgaeCal Plus bone supplement  Patient is able to manage medications  Activities of Daily Living (ADLs)/Instrumental Activities of Daily Living (IADLs):   Walk and transfer into and out of bed and chair?: Yes  Dress and groom yourself?: Yes    Bathe or shower yourself?: Yes    Feed yourself? Yes  Do your laundry/housekeeping?: Yes  Manage your money, pay your bills and track your expenses?: Yes  Make your own meals?: Yes    Do your own shopping?: Yes    Previous Hospitalizations:   Any hospitalizations or ED visits within the last 12 months?: No      Advance Care Planning:   Living will: Yes    Durable POA for healthcare:  Yes    Advanced directive: Yes      Cognitive Screening:   Provider or family/friend/caregiver concerned regarding cognition?: No    PREVENTIVE SCREENINGS      Cardiovascular Screening:    General: Screening Current      Colorectal Cancer Screening:     General: Screening Current      Breast Cancer Screening:     General: Screening Current    Due for: Mammogram        Cervical Cancer Screening:    General: Screening Not Indicated      Osteoporosis Screening:    General: Screening Not Indicated, History Osteoporosis and Screening Current      Abdominal Aortic Aneurysm (AAA) Screening:        General: Screening Not Indicated      Lung Cancer Screening:     General: Screening Not Indicated      Hepatitis C Screening:    General: Screening Current      Preventive Screening Comments: Reviewed chart- labs done one month ago  Screening, Brief Intervention, and Referral to Treatment (SBIRT)    Screening  Typical number of drinks in a day: 0  Typical number of drinks in a week: 0  Interpretation: Low risk drinking behavior  AUDIT-C Screenin) How often did you have a drink containing alcohol in the past year? monthly or less  2) How many drinks did you have on a typical day when you were drinking in the past year? 0  3) How often did you have 6 or more drinks on one occasion in the past year? never    AUDIT-C Score: 1  Interpretation: Score 0-2 (female): Negative screen for alcohol misuse    Single Item Drug Screening:  How often have you used an illegal drug (including marijuana) or a prescription medication for non-medical reasons in the past year? never    Single Item Drug Screen Score: 0  Interpretation: Negative screen for possible drug use disorder    No results found  Physical Exam:     /80 (BP Location: Left arm, Patient Position: Sitting, Cuff Size: Standard)   Pulse 72   Temp (!) 97 3 °F (36 3 °C) (Temporal)   Ht 5' 3 78" (1 62 m)   Wt 54 4 kg (120 lb)   SpO2 99%   BMI 20 74 kg/m²     Physical Exam  Vitals and nursing note reviewed  Constitutional:       General: She is not in acute distress  Appearance: Normal appearance  She is well-developed  She is not ill-appearing  HENT:      Head: Normocephalic and atraumatic  Eyes:      Conjunctiva/sclera: Conjunctivae normal    Neck:      Vascular: No carotid bruit  Cardiovascular:      Rate and Rhythm: Normal rate and regular rhythm  Heart sounds: No murmur heard  Pulmonary:      Effort: Pulmonary effort is normal  No respiratory distress  Breath sounds: Normal breath sounds  Abdominal:      Palpations: Abdomen is soft  Tenderness: There is no abdominal tenderness  Musculoskeletal:      Cervical back: Neck supple  Right lower leg: No edema  Left lower leg: No edema  Lymphadenopathy:      Cervical: No cervical adenopathy  Skin:     General: Skin is warm and dry  Neurological:      Mental Status: She is alert and oriented to person, place, and time     Psychiatric:         Mood and Affect: Mood normal          Behavior: Behavior normal           Roldan Harper MD

## 2022-12-05 NOTE — PATIENT INSTRUCTIONS
Medicare Preventive Visit Patient Instructions  Thank you for completing your Welcome to Medicare Visit or Medicare Annual Wellness Visit today  Your next wellness visit will be due in one year (12/6/2023)  The screening/preventive services that you may require over the next 5-10 years are detailed below  Some tests may not apply to you based off risk factors and/or age  Screening tests ordered at today's visit but not completed yet may show as past due  Also, please note that scanned in results may not display below  Preventive Screenings:  Service Recommendations Previous Testing/Comments   Colorectal Cancer Screening  * Colonoscopy    * Fecal Occult Blood Test (FOBT)/Fecal Immunochemical Test (FIT)  * Fecal DNA/Cologuard Test  * Flexible Sigmoidoscopy Age: 39-70 years old   Colonoscopy: every 10 years (may be performed more frequently if at higher risk)  OR  FOBT/FIT: every 1 year  OR  Cologuard: every 3 years  OR  Sigmoidoscopy: every 5 years  Screening may be recommended earlier than age 39 if at higher risk for colorectal cancer  Also, an individualized decision between you and your healthcare provider will decide whether screening between the ages of 74-80 would be appropriate  Colonoscopy: 09/18/2018  FOBT/FIT: Not on file  Cologuard: Not on file  Sigmoidoscopy: Not on file    Screening Current     Breast Cancer Screening Age: 36 years old  Frequency: every 1-2 years  Not required if history of left and right mastectomy Mammogram: 03/23/2021    Screening Current   Cervical Cancer Screening Between the ages of 21-29, pap smear recommended once every 3 years  Between the ages of 33-67, can perform pap smear with HPV co-testing every 5 years     Recommendations may differ for women with a history of total hysterectomy, cervical cancer, or abnormal pap smears in past  Pap Smear: Not on file    Screening Not Indicated   Hepatitis C Screening Once for adults born between 1945 and 1965  More frequently in patients at high risk for Hepatitis C Hep C Antibody: 08/30/2019    Screening Current   Diabetes Screening 1-2 times per year if you're at risk for diabetes or have pre-diabetes Fasting glucose: 89 mg/dL (9/22/2020)  A1C: No results in last 5 years (No results in last 5 years)      Cholesterol Screening Once every 5 years if you don't have a lipid disorder  May order more often based on risk factors  Lipid panel: 09/22/2020    Screening Current     Other Preventive Screenings Covered by Medicare:  1  Abdominal Aortic Aneurysm (AAA) Screening: covered once if your at risk  You're considered to be at risk if you have a family history of AAA  2  Lung Cancer Screening: covers low dose CT scan once per year if you meet all of the following conditions: (1) Age 50-69; (2) No signs or symptoms of lung cancer; (3) Current smoker or have quit smoking within the last 15 years; (4) You have a tobacco smoking history of at least 20 pack years (packs per day multiplied by number of years you smoked); (5) You get a written order from a healthcare provider  3  Glaucoma Screening: covered annually if you're considered high risk: (1) You have diabetes OR (2) Family history of glaucoma OR (3)  aged 48 and older OR (3)  American aged 72 and older  3  Osteoporosis Screening: covered every 2 years if you meet one of the following conditions: (1) You're estrogen deficient and at risk for osteoporosis based off medical history and other findings; (2) Have a vertebral abnormality; (3) On glucocorticoid therapy for more than 3 months; (4) Have primary hyperparathyroidism; (5) On osteoporosis medications and need to assess response to drug therapy  · Last bone density test (DXA Scan): 05/10/2022   5  HIV Screening: covered annually if you're between the age of 15-65  Also covered annually if you are younger than 13 and older than 72 with risk factors for HIV infection   For pregnant patients, it is covered up to 3 times per pregnancy  Immunizations:  Immunization Recommendations   Influenza Vaccine Annual influenza vaccination during flu season is recommended for all persons aged >= 6 months who do not have contraindications   Pneumococcal Vaccine   * Pneumococcal conjugate vaccine = PCV13 (Prevnar 13), PCV15 (Vaxneuvance), PCV20 (Prevnar 20)  * Pneumococcal polysaccharide vaccine = PPSV23 (Pneumovax) Adults 25-60 years old: 1-3 doses may be recommended based on certain risk factors  Adults 72 years old: 1-2 doses may be recommended based off what pneumonia vaccine you previously received   Hepatitis B Vaccine 3 dose series if at intermediate or high risk (ex: diabetes, end stage renal disease, liver disease)   Tetanus (Td) Vaccine - COST NOT COVERED BY MEDICARE PART B Following completion of primary series, a booster dose should be given every 10 years to maintain immunity against tetanus  Td may also be given as tetanus wound prophylaxis  Tdap Vaccine - COST NOT COVERED BY MEDICARE PART B Recommended at least once for all adults  For pregnant patients, recommended with each pregnancy  Shingles Vaccine (Shingrix) - COST NOT COVERED BY MEDICARE PART B  2 shot series recommended in those aged 48 and above     Health Maintenance Due:      Topic Date Due   • Breast Cancer Screening: Mammogram  03/23/2022   • Colorectal Cancer Screening  09/18/2023   • Hepatitis C Screening  Completed     Immunizations Due:      Topic Date Due   • Hepatitis B Vaccine (1 of 3 - 3-dose series) Never done   • Influenza Vaccine (1) 09/01/2022     Advance Directives   What are advance directives? Advance directives are legal documents that state your wishes and plans for medical care  These plans are made ahead of time in case you lose your ability to make decisions for yourself  Advance directives can apply to any medical decision, such as the treatments you want, and if you want to donate organs  What are the types of advance directives? There are many types of advance directives, and each state has rules about how to use them  You may choose a combination of any of the following:  · Living will: This is a written record of the treatment you want  You can also choose which treatments you do not want, which to limit, and which to stop at a certain time  This includes surgery, medicine, IV fluid, and tube feedings  · Durable power of  for healthcare [facility]): This is a written record that states who you want to make healthcare choices for you when you are unable to make them for yourself  This person, called a proxy, is usually a family member or a friend  You may choose more than 1 proxy  · Do not resuscitate (DNR) order:  A DNR order is used in case your heart stops beating or you stop breathing  It is a request not to have certain forms of treatment, such as CPR  A DNR order may be included in other types of advance directives  · Medical directive: This covers the care that you want if you are in a coma, near death, or unable to make decisions for yourself  You can list the treatments you want for each condition  Treatment may include pain medicine, surgery, blood transfusions, dialysis, IV or tube feedings, and a ventilator (breathing machine)  · Values history: This document has questions about your views, beliefs, and how you feel and think about life  This information can help others choose the care that you would choose  Why are advance directives important? An advance directive helps you control your care  Although spoken wishes may be used, it is better to have your wishes written down  Spoken wishes can be misunderstood, or not followed  Treatments may be given even if you do not want them  An advance directive may make it easier for your family to make difficult choices about your care         © Copyright [employer] 2018 Information is for End User's use only and may not be sold, redistributed or otherwise used for commercial purposes   All illustrations and images included in CareNotes® are the copyrighted property of A [employer] A M [employer]  or [facility] Michelle Melendez yes

## 2023-12-06 ENCOUNTER — TELEPHONE (OUTPATIENT)
Age: 73
End: 2023-12-06

## 2023-12-06 ENCOUNTER — OFFICE VISIT (OUTPATIENT)
Dept: FAMILY MEDICINE CLINIC | Facility: CLINIC | Age: 73
End: 2023-12-06
Payer: MEDICARE

## 2023-12-06 VITALS
SYSTOLIC BLOOD PRESSURE: 116 MMHG | BODY MASS INDEX: 21 KG/M2 | HEART RATE: 74 BPM | WEIGHT: 123 LBS | TEMPERATURE: 97.9 F | DIASTOLIC BLOOD PRESSURE: 70 MMHG | OXYGEN SATURATION: 99 % | RESPIRATION RATE: 16 BRPM | HEIGHT: 64 IN

## 2023-12-06 DIAGNOSIS — D72.819 LEUKOPENIA, UNSPECIFIED TYPE: ICD-10-CM

## 2023-12-06 DIAGNOSIS — M81.0 AGE-RELATED OSTEOPOROSIS WITHOUT CURRENT PATHOLOGICAL FRACTURE: ICD-10-CM

## 2023-12-06 DIAGNOSIS — Z78.0 POSTMENOPAUSE: ICD-10-CM

## 2023-12-06 DIAGNOSIS — Z13.220 SCREENING CHOLESTEROL LEVEL: ICD-10-CM

## 2023-12-06 DIAGNOSIS — Z12.11 COLON CANCER SCREENING: ICD-10-CM

## 2023-12-06 DIAGNOSIS — Z12.31 ENCOUNTER FOR SCREENING MAMMOGRAM FOR BREAST CANCER: ICD-10-CM

## 2023-12-06 DIAGNOSIS — Z00.00 MEDICARE ANNUAL WELLNESS VISIT, SUBSEQUENT: Primary | ICD-10-CM

## 2023-12-06 DIAGNOSIS — R73.01 IMPAIRED FASTING GLUCOSE: ICD-10-CM

## 2023-12-06 PROBLEM — N81.2 UTEROVAGINAL PROLAPSE, INCOMPLETE: Status: RESOLVED | Noted: 2019-06-27 | Resolved: 2023-12-06

## 2023-12-06 PROBLEM — N32.81 OAB (OVERACTIVE BLADDER): Status: RESOLVED | Noted: 2020-05-06 | Resolved: 2023-12-06

## 2023-12-06 PROCEDURE — 99213 OFFICE O/P EST LOW 20 MIN: CPT | Performed by: FAMILY MEDICINE

## 2023-12-06 PROCEDURE — G0438 PPPS, INITIAL VISIT: HCPCS | Performed by: FAMILY MEDICINE

## 2023-12-06 NOTE — TELEPHONE ENCOUNTER
Pt was in the office today and she was informed that she will put in blodowork for her to complete but there are no scripts in the computer  yet can we please put in and she will come into [facility] lab to be processed

## 2023-12-06 NOTE — PATIENT INSTRUCTIONS
Try voltaren (diclofenac) gel for arthritis      Medicare Preventive Visit Patient Instructions  Thank you for completing your Welcome to Medicare Visit or Medicare Annual Wellness Visit today. Your next wellness visit will be due in one year (12/6/2024). The screening/preventive services that you may require over the next 5-10 years are detailed below. Some tests may not apply to you based off risk factors and/or age. Screening tests ordered at today's visit but not completed yet may show as past due. Also, please note that scanned in results may not display below. Preventive Screenings:  Service Recommendations Previous Testing/Comments   Colorectal Cancer Screening  * Colonoscopy    * Fecal Occult Blood Test (FOBT)/Fecal Immunochemical Test (FIT)  * Fecal DNA/Cologuard Test  * Flexible Sigmoidoscopy Age: 43-73 years old   Colonoscopy: every 10 years (may be performed more frequently if at higher risk)  OR  FOBT/FIT: every 1 year  OR  Cologuard: every 3 years  OR  Sigmoidoscopy: every 5 years  Screening may be recommended earlier than age 39 if at higher risk for colorectal cancer. Also, an individualized decision between you and your healthcare provider will decide whether screening between the ages of 77-80 would be appropriate. Colonoscopy: 09/18/2018  FOBT/FIT: Not on file  Cologuard: Not on file  Sigmoidoscopy: Not on file    Screening Current     Breast Cancer Screening Age: 36 years old  Frequency: every 1-2 years  Not required if history of left and right mastectomy Mammogram: 03/23/2021        Cervical Cancer Screening Between the ages of 21-29, pap smear recommended once every 3 years. Between the ages of 32-69, can perform pap smear with HPV co-testing every 5 years.    Recommendations may differ for women with a history of total hysterectomy, cervical cancer, or abnormal pap smears in past. Pap Smear: Not on file    Screening Not Indicated   Hepatitis C Screening Once for adults born between 1945 and 1965  More frequently in patients at high risk for Hepatitis C Hep C Antibody: 08/30/2019    Screening Current   Diabetes Screening 1-2 times per year if you're at risk for diabetes or have pre-diabetes Fasting glucose: 89 mg/dL (9/22/2020)  A1C: No results in last 5 years (No results in last 5 years)      Cholesterol Screening Once every 5 years if you don't have a lipid disorder. May order more often based on risk factors. Lipid panel: 09/22/2020    Screening Current     Other Preventive Screenings Covered by Medicare:  Abdominal Aortic Aneurysm (AAA) Screening: covered once if your at risk. You're considered to be at risk if you have a family history of AAA. Lung Cancer Screening: covers low dose CT scan once per year if you meet all of the following conditions: (1) Age 48-67; (2) No signs or symptoms of lung cancer; (3) Current smoker or have quit smoking within the last 15 years; (4) You have a tobacco smoking history of at least 20 pack years (packs per day multiplied by number of years you smoked); (5) You get a written order from a healthcare provider. Glaucoma Screening: covered annually if you're considered high risk: (1) You have diabetes OR (2) Family history of glaucoma OR (3)  aged 48 and older OR (3)  American aged 72 and older  Osteoporosis Screening: covered every 2 years if you meet one of the following conditions: (1) You're estrogen deficient and at risk for osteoporosis based off medical history and other findings; (2) Have a vertebral abnormality; (3) On glucocorticoid therapy for more than 3 months; (4) Have primary hyperparathyroidism; (5) On osteoporosis medications and need to assess response to drug therapy. Last bone density test (DXA Scan): 05/10/2022. HIV Screening: covered annually if you're between the age of 14-79. Also covered annually if you are younger than 13 and older than 72 with risk factors for HIV infection.  For pregnant patients, it is covered up to 3 times per pregnancy. Immunizations:  Immunization Recommendations   Influenza Vaccine Annual influenza vaccination during flu season is recommended for all persons aged >= 6 months who do not have contraindications   Pneumococcal Vaccine   * Pneumococcal conjugate vaccine = PCV13 (Prevnar 13), PCV15 (Vaxneuvance), PCV20 (Prevnar 20)  * Pneumococcal polysaccharide vaccine = PPSV23 (Pneumovax) Adults 88-30 yo with certain risk factors or if 69+ yo  If never received any pneumonia vaccine: recommend Prevnar 20 (PCV20)  Give PCV20 if previously received 1 dose of PCV13 or PPSV23   Hepatitis B Vaccine 3 dose series if at intermediate or high risk (ex: diabetes, end stage renal disease, liver disease)   Respiratory syncytial virus (RSV) Vaccine - COVERED BY MEDICARE PART D  * RSVPreF3 (Arexvy) CDC recommends that adults 61years of age and older may receive a single dose of RSV vaccine using shared clinical decision-making (SCDM)   Tetanus (Td) Vaccine - COST NOT COVERED BY MEDICARE PART B Following completion of primary series, a booster dose should be given every 10 years to maintain immunity against tetanus. Td may also be given as tetanus wound prophylaxis. Tdap Vaccine - COST NOT COVERED BY MEDICARE PART B Recommended at least once for all adults. For pregnant patients, recommended with each pregnancy. Shingles Vaccine (Shingrix) - COST NOT COVERED BY MEDICARE PART B  2 shot series recommended in those 19 years and older who have or will have weakened immune systems or those 50 years and older     Health Maintenance Due:      Topic Date Due    Breast Cancer Screening: Mammogram  03/23/2022    Colorectal Cancer Screening  09/18/2023    Hepatitis C Screening  Completed     Immunizations Due:  There are no preventive care reminders to display for this patient. Advance Directives   What are advance directives?   Advance directives are legal documents that state your wishes and plans for medical care. These plans are made ahead of time in case you lose your ability to make decisions for yourself. Advance directives can apply to any medical decision, such as the treatments you want, and if you want to donate organs. What are the types of advance directives? There are many types of advance directives, and each state has rules about how to use them. You may choose a combination of any of the following:  Living will: This is a written record of the treatment you want. You can also choose which treatments you do not want, which to limit, and which to stop at a certain time. This includes surgery, medicine, IV fluid, and tube feedings. Durable power of  for healthcare [facility] [place]): This is a written record that states who you want to make healthcare choices for you when you are unable to make them for yourself. This person, called a proxy, is usually a family member or a friend. You may choose more than 1 proxy. Do not resuscitate (DNR) order:  A DNR order is used in case your heart stops beating or you stop breathing. It is a request not to have certain forms of treatment, such as CPR. A DNR order may be included in other types of advance directives. Medical directive: This covers the care that you want if you are in a coma, near death, or unable to make decisions for yourself. You can list the treatments you want for each condition. Treatment may include pain medicine, surgery, blood transfusions, dialysis, IV or tube feedings, and a ventilator (breathing machine). Values history: This document has questions about your views, beliefs, and how you feel and think about life. This information can help others choose the care that you would choose. Why are advance directives important? An advance directive helps you control your care. Although spoken wishes may be used, it is better to have your wishes written down. Spoken wishes can be misunderstood, or not followed.  Treatments may be given even if you do not want them. An advance directive may make it easier for your family to make difficult choices about your care. Urinary Incontinence   Urinary incontinence (UI)  is when you lose control of your bladder. UI develops because your bladder cannot store or empty urine properly. The 3 most common types of UI are stress incontinence, urge incontinence, or both. Medicines:   May be given to help strengthen your bladder control. Report any side effects of medication to your healthcare provider. Do pelvic muscle exercises often:  Your pelvic muscles help you stop urinating. Squeeze these muscles tight for 5 seconds, then relax for 5 seconds. Gradually work up to squeezing for 10 seconds. Do 3 sets of 15 repetitions a day, or as directed. This will help strengthen your pelvic muscles and improve bladder control. Train your bladder:  Go to the bathroom at set times, such as every 2 hours, even if you do not feel the urge to go. You can also try to hold your urine when you feel the urge to go. For example, hold your urine for 5 minutes when you feel the urge to go. As that becomes easier, hold your urine for 10 minutes. Self-care:   Keep a UI record. Write down how often you leak urine and how much you leak. Make a note of what you were doing when you leaked urine. Drink liquids as directed. You may need to limit the amount of liquid you drink to help control your urine leakage. Do not drink any liquid right before you go to bed. Limit or do not have drinks that contain caffeine or alcohol. Prevent constipation. Eat a variety of high-fiber foods. Good examples are high-fiber cereals, beans, vegetables, and whole-grain breads. Walking is the best way to trigger your intestines to have a bowel movement. Exercise regularly and maintain a healthy weight. Weight loss and exercise will decrease pressure on your bladder and help you control your leakage. Use a catheter as directed  to help empty your bladder. A catheter is a tiny, plastic tube that is put into your bladder to drain your urine. Go to behavior therapy as directed. Behavior therapy may be used to help you learn to control your urge to urinate. © Copyright [employer] 2018 Information is for End User's use only and may not be sold, redistributed or otherwise used for commercial purposes.  All illustrations and images included in CareNotes® are the copyrighted property of [employer].D.A[employer]M., Inc. or [address] [place] [address]

## 2023-12-06 NOTE — PROGRESS NOTES
Assessment and Plan:     Problem List Items Addressed This Visit        Musculoskeletal and Integument    Age related osteoporosis     She is doing well with exercise, vit D, calcium intake. Tried fosamax in the past with side effects. She is hoping to improve without prescription medication. Consider prolia if bone density is worse on next dexa           Relevant Orders    Vitamin D 25 hydroxy    DXA bone density spine hip and pelvis   Other Visit Diagnoses     Medicare annual wellness visit, subsequent    -  Primary    Encounter for screening mammogram for breast cancer        Relevant Orders    Mammo screening bilateral w 3d & cad    Colon cancer screening        discussed options. has had difficulty with tolerating colonoscopy prep as well as post-procedure nausea. will proceed with cologuard. Relevant Orders    Cologuard    Postmenopause        Relevant Orders    DXA bone density spine hip and pelvis    Screening cholesterol level        Relevant Orders    Lipid Panel with Direct LDL reflex    Leukopenia, unspecified type        mildly decreased wbc on last year's labs- repeat w/ upcoming labs. Relevant Orders    CBC and differential    Impaired fasting glucose        check cmp    Relevant Orders    Comprehensive metabolic panel          Depression Screening and Follow-up Plan: Patient was screened for depression during today's encounter. They screened negative with a PHQ-2 score of 0. Preventive health issues were discussed with patient, and age appropriate screening tests were ordered as noted in patient's After Visit Summary. Personalized health advice and appropriate referrals for health education or preventive services given if needed, as noted in patient's After Visit Summary. History of Present Illness:     Patient presents for a Medicare Wellness Visit    Has been on algical- would like dexa to see if bmd has improved. Exercises regularly, keeps active.     Will bring in living will.  She notes she would not want life-prolonging measures if she was not expected to have meaningful survival.  Is not sure at this point if she would pursue aggressive treatment for a cancer if she had one. Would depend on what the treatment options would be. Occ hot flashes. Rare. Was with her son last week, he said the whites of her eyes looked yellow. She doesn't notice a change in color. Anxiety is improved from previous. She has had trouble with nausea from anesthesia with previous colonoscopy and also difficulty tolerating prep. Last scope with diverticuli without polyps.       HPI   Patient Care Team:  Mary Leonard MD as PCP - General (Family Medicine)  Mary Leonard MD as PCP - PCP-Located within [facility] Attributed-Roster     Review of Systems:     Review of Systems     Problem List:     Patient Active Problem List   Diagnosis   • Age related osteoporosis   • Osteoarthrosis, hand   • Achenbach's syndrome   • Anxiety      Past Medical and Surgical History:     Past Medical History:   Diagnosis Date   • Allergic     Noted in chart   • Anxiety Noted in chart    Feeling better   • Arthritis Noted in chart   • De Quervain's tenosynovitis    • Osteoporosis    • Puckering of macula, right eye    • Retinal tear of right eye    • Visual impairment Noted in chart    Tear in retina/repair then surgery for retinal pucker     Past Surgical History:   Procedure Laterality Date   • EYE SURGERY     • HYSTERECTOMY  12/14/2020   • RETINAL DETACHMENT SURGERY     • TONSILLECTOMY     • TUBAL LIGATION     • WRIST SURGERY        Family History:     Family History   Problem Relation Age of Onset   • COPD Mother    • Coronary artery disease Mother    • Hypertension Mother    • Osteoporosis Mother    • Emphysema Father    • Antithrombin III deficiency Brother    • Diverticulitis Brother    • Hypertension Brother    • No Known Problems Sister    • No Known Problems Daughter    • No Known Problems Maternal Grandmother    • No Known Problems Maternal Grandfather    • No Known Problems Paternal Grandmother    • No Known Problems Paternal Grandfather    • Colon cancer Paternal Uncle    • Breast cancer Paternal Aunt 80   • No Known Problems Paternal Aunt    • No Known Problems Paternal Aunt    • No Known Problems Paternal Aunt    • No Known Problems Paternal Aunt    • No Known Problems Paternal Aunt    • No Known Problems Paternal Aunt    • No Known Problems Paternal Aunt    • No Known Problems Maternal Aunt    • No Known Problems Maternal Aunt       Social History:     Social History     Socioeconomic History   • Marital status:      Spouse name: None   • Number of children: 2   • Years of education: None   • Highest education level: None   Occupational History   • None   Tobacco Use   • Smoking status: Never   • Smokeless tobacco: Never   Vaping Use   • Vaping Use: Never used   Substance and Sexual Activity   • Alcohol use: Yes   • Drug use: Never   • Sexual activity: Not Currently   Other Topics Concern   • None   Social History Narrative   • None     Social Determinants of Health     Financial Resource Strain: Low Risk  (12/5/2023)    Overall Financial Resource Strain (CARDIA)    • Difficulty of Paying Living Expenses: Not hard at all   Food Insecurity: Not on file   Transportation Needs: No Transportation Needs (12/5/2023)    PRAPARE - Transportation    • Lack of Transportation (Medical): No    • Lack of Transportation (Non-Medical):  No   Physical Activity: Not on file   Stress: Not on file   Social Connections: Not on file   Intimate Partner Violence: Not on file   Housing Stability: Not on file      Medications and Allergies:     Current Outpatient Medications   Medication Sig Dispense Refill   • Ascorbic Acid (VITAMIN C) 100 MG tablet Take 500 mg by mouth 2 (two) times a day     • cholecalciferol (VITAMIN D3) 1,000 units tablet Take 1,000 Units by mouth daily     • ferrous sulfate 325 (65 Fe) mg tablet Take 1 tablet (325 mg total) by mouth 3 (three) times a week     • Multiple Vitamins-Minerals (Algae Based Calcium) TABS Take 2 capsules by mouth 2 (two) times a day (Calcium 360mg, Magnesium 175mg, Vitamin C 25mg, Vitamin D 800IU) (Stronium Boost 680mg qhs)       No current facility-administered medications for this visit. Allergies   Allergen Reactions   • Alendronate Abdominal Pain     Fosamax   • Other Other (See Comments)     Scratchy throat   • Penicillins Rash      Immunizations:     Immunization History   Administered Date(s) Administered   • COVID-19 PFIZER VACCINE 0.3 ML IM 04/01/2020, 04/01/2020, 03/10/2021, 04/01/2021, 10/18/2021   • COVID-19 Pfizer Vac BIVALENT Braxton-sucrose 12 Yr+ IM 11/03/2022   • COVID-19 Pfizer vac (Braxton-sucrose, gray cap) 12 yr+ IM 05/19/2022   • INFLUENZA 11/06/2006, 11/26/2007, 12/06/2010, 11/01/2011, 10/12/2019, 10/14/2022   • Influenza Quadrivalent, 6-35 Months IM 09/01/2016, 10/18/2017   • Influenza, high dose seasonal 0.7 mL 10/01/2020, 11/03/2021, 10/11/2023   • Influenza, seasonal, injectable 10/31/2013, 10/06/2022   • Pneumococcal Conjugate 13-Valent 12/07/2016   • Pneumococcal Polysaccharide PPV23 08/22/2019   • TD (adult) Preservative Free 10/31/2013   • Td (adult), adsorbed 11/06/2006   • Tdap 09/21/2018      Health Maintenance:         Topic Date Due   • Breast Cancer Screening: Mammogram  03/23/2022   • Colorectal Cancer Screening  09/18/2023   • Hepatitis C Screening  Completed     There are no preventive care reminders to display for this patient. Medicare Screening Tests and Risk Assessments:     Anne Quinonez is here for her Subsequent Wellness visit. Health Risk Assessment:   Patient rates overall health as very good. Patient feels that their physical health rating is same. Patient is satisfied with their life. Eyesight was rated as slightly worse. Hearing was rated as same. Patient feels that their emotional and mental health rating is slightly better.  Patients states they are never, rarely angry. Patient states they are never, rarely unusually tired/fatigued. Pain experienced in the last 7 days has been none. Patient states that she has experienced no weight loss or gain in last 6 months. Depression Screening:   PHQ-2 Score: 0      Fall Risk Screening: In the past year, patient has experienced: no history of falling in past year      Urinary Incontinence Screening:   Patient has leaked urine accidently in the last six months. Home Safety:  Patient does not have trouble with stairs inside or outside of their home. Patient has working smoke alarms and has no working carbon monoxide detector. Home safety hazards include: none. Nutrition:   Current diet is Other (please comment). Overall balanced    Medications:   Patient is currently taking over-the-counter supplements. OTC medications include: see medication list. Patient is able to manage medications. Activities of Daily Living (ADLs)/Instrumental Activities of Daily Living (IADLs):   Walk and transfer into and out of bed and chair?: Yes  Dress and groom yourself?: Yes    Bathe or shower yourself?: Yes    Feed yourself? Yes  Do your laundry/housekeeping?: Yes  Manage your money, pay your bills and track your expenses?: Yes  Make your own meals?: Yes    Do your own shopping?: Yes    Durable Medical Equipment Suppliers  None    Previous Hospitalizations:   Any hospitalizations or ED visits within the last 12 months?: No      Advance Care Planning:   Living will: Yes    Durable POA for healthcare:  Yes    Advanced directive: Yes      Cognitive Screening:   Provider or family/friend/caregiver concerned regarding cognition?: No    PREVENTIVE SCREENINGS      Cardiovascular Screening:    General: Screening Current      Colorectal Cancer Screening:     General: Screening Current    Due for: Cologuard      Breast Cancer Screening:       Due for: Mammogram        Cervical Cancer Screening:    General: Screening Not Indicated      Osteoporosis Screening:    General: Screening Not Indicated and History Osteoporosis      Abdominal Aortic Aneurysm (AAA) Screening:        General: Screening Not Indicated      Lung Cancer Screening:     General: Screening Not Indicated      Hepatitis C Screening:    General: Screening Current    Screening, Brief Intervention, and Referral to Treatment (SBIRT)    Screening  Typical number of drinks in a day: 0  Typical number of drinks in a week: 0  Interpretation: Low risk drinking behavior. AUDIT-C Screenin) How often did you have a drink containing alcohol in the past year? monthly or less  2) How many drinks did you have on a typical day when you were drinking in the past year? 1 to 2  3) How often did you have 6 or more drinks on one occasion in the past year? never    AUDIT-C Score: 1  Interpretation: Score 0-2 (female): Negative screen for alcohol misuse    Single Item Drug Screening:  How often have you used an illegal drug (including marijuana) or a prescription medication for non-medical reasons in the past year? never    Single Item Drug Screen Score: 0  Interpretation: Negative screen for possible drug use disorder    No results found. Physical Exam:     /70   Pulse 74   Temp 97.9 °F (36.6 °C)   Resp 16   Ht 5' 3.5" (1.613 m)   Wt 55.8 kg (123 lb)   SpO2 99%   BMI 21.45 kg/m²   Wt Readings from Last 3 Encounters:   23 55.8 kg (123 lb)   22 54.4 kg (120 lb)   22 54.4 kg (120 lb)       Physical Exam  Vitals and nursing note reviewed. Constitutional:       General: She is not in acute distress. Appearance: Normal appearance. She is well-developed. She is not ill-appearing. HENT:      Head: Normocephalic and atraumatic. Eyes:      Comments: Mild conjunctival injection, no icterus   Neck:      Vascular: No carotid bruit. Cardiovascular:      Rate and Rhythm: Normal rate and regular rhythm. Heart sounds: No murmur heard.   Pulmonary: Effort: Pulmonary effort is normal. No respiratory distress. Breath sounds: Normal breath sounds. Abdominal:      Palpations: Abdomen is soft. Tenderness: There is no abdominal tenderness. Musculoskeletal:      Cervical back: Neck supple. Right lower leg: No edema. Left lower leg: No edema. Lymphadenopathy:      Cervical: No cervical adenopathy. Skin:     General: Skin is warm and dry. Neurological:      Mental Status: She is alert and oriented to person, place, and time.    Psychiatric:         Mood and Affect: Mood normal.         Behavior: Behavior normal.          Priscila Escalante MD

## 2023-12-06 NOTE — ASSESSMENT & PLAN NOTE
She is doing well with exercise, vit D, calcium intake. Tried fosamax in the past with side effects. She is hoping to improve without prescription medication.   Consider prolia if bone density is worse on next dexa

## 2023-12-14 ENCOUNTER — APPOINTMENT (OUTPATIENT)
Dept: LAB | Facility: CLINIC | Age: 73
End: 2023-12-14
Payer: MEDICARE

## 2023-12-14 DIAGNOSIS — M81.0 AGE-RELATED OSTEOPOROSIS WITHOUT CURRENT PATHOLOGICAL FRACTURE: ICD-10-CM

## 2023-12-14 DIAGNOSIS — R73.01 IMPAIRED FASTING GLUCOSE: ICD-10-CM

## 2023-12-14 DIAGNOSIS — D72.819 LEUKOPENIA, UNSPECIFIED TYPE: ICD-10-CM

## 2023-12-14 DIAGNOSIS — Z13.220 SCREENING CHOLESTEROL LEVEL: ICD-10-CM

## 2023-12-14 LAB
25(OH)D3 SERPL-MCNC: 47.6 NG/ML (ref 30–100)
ALBUMIN SERPL BCP-MCNC: 4 G/DL (ref 3.5–5)
ALP SERPL-CCNC: 66 U/L (ref 34–104)
ALT SERPL W P-5'-P-CCNC: 16 U/L (ref 7–52)
ANION GAP SERPL CALCULATED.3IONS-SCNC: 5 MMOL/L
AST SERPL W P-5'-P-CCNC: 16 U/L (ref 13–39)
BASOPHILS # BLD AUTO: 0.03 THOUSANDS/ÂΜL (ref 0–0.1)
BASOPHILS NFR BLD AUTO: 1 % (ref 0–1)
BILIRUB SERPL-MCNC: 0.97 MG/DL (ref 0.2–1)
BUN SERPL-MCNC: 17 MG/DL (ref 5–25)
CALCIUM SERPL-MCNC: 9.3 MG/DL (ref 8.4–10.2)
CHLORIDE SERPL-SCNC: 105 MMOL/L (ref 96–108)
CHOLEST SERPL-MCNC: 203 MG/DL
CO2 SERPL-SCNC: 32 MMOL/L (ref 21–32)
CREAT SERPL-MCNC: 0.86 MG/DL (ref 0.6–1.3)
EOSINOPHIL # BLD AUTO: 0.05 THOUSAND/ÂΜL (ref 0–0.61)
EOSINOPHIL NFR BLD AUTO: 1 % (ref 0–6)
ERYTHROCYTE [DISTWIDTH] IN BLOOD BY AUTOMATED COUNT: 13 % (ref 11.6–15.1)
GFR SERPL CREATININE-BSD FRML MDRD: 67 ML/MIN/1.73SQ M
GLUCOSE P FAST SERPL-MCNC: 89 MG/DL (ref 65–99)
HCT VFR BLD AUTO: 42.1 % (ref 34.8–46.1)
HDLC SERPL-MCNC: 91 MG/DL
HGB BLD-MCNC: 14.4 G/DL (ref 11.5–15.4)
IMM GRANULOCYTES # BLD AUTO: 0.01 THOUSAND/UL (ref 0–0.2)
IMM GRANULOCYTES NFR BLD AUTO: 0 % (ref 0–2)
LDLC SERPL CALC-MCNC: 97 MG/DL (ref 0–100)
LYMPHOCYTES # BLD AUTO: 1.62 THOUSANDS/ÂΜL (ref 0.6–4.47)
LYMPHOCYTES NFR BLD AUTO: 33 % (ref 14–44)
MCH RBC QN AUTO: 31 PG (ref 26.8–34.3)
MCHC RBC AUTO-ENTMCNC: 34.2 G/DL (ref 31.4–37.4)
MCV RBC AUTO: 91 FL (ref 82–98)
MONOCYTES # BLD AUTO: 0.3 THOUSAND/ÂΜL (ref 0.17–1.22)
MONOCYTES NFR BLD AUTO: 6 % (ref 4–12)
NEUTROPHILS # BLD AUTO: 2.84 THOUSANDS/ÂΜL (ref 1.85–7.62)
NEUTS SEG NFR BLD AUTO: 59 % (ref 43–75)
NRBC BLD AUTO-RTO: 0 /100 WBCS
PLATELET # BLD AUTO: 207 THOUSANDS/UL (ref 149–390)
PMV BLD AUTO: 9.4 FL (ref 8.9–12.7)
POTASSIUM SERPL-SCNC: 3.9 MMOL/L (ref 3.5–5.3)
PROT SERPL-MCNC: 6.3 G/DL (ref 6.4–8.4)
RBC # BLD AUTO: 4.64 MILLION/UL (ref 3.81–5.12)
SODIUM SERPL-SCNC: 142 MMOL/L (ref 135–147)
TRIGL SERPL-MCNC: 74 MG/DL
WBC # BLD AUTO: 4.85 THOUSAND/UL (ref 4.31–10.16)

## 2023-12-14 PROCEDURE — 80061 LIPID PANEL: CPT

## 2023-12-14 PROCEDURE — 36415 COLL VENOUS BLD VENIPUNCTURE: CPT

## 2023-12-14 PROCEDURE — 85025 COMPLETE CBC W/AUTO DIFF WBC: CPT

## 2023-12-14 PROCEDURE — 80053 COMPREHEN METABOLIC PANEL: CPT

## 2023-12-14 PROCEDURE — 82306 VITAMIN D 25 HYDROXY: CPT

## 2024-04-03 LAB — COLOGUARD RESULT REPORTABLE: NEGATIVE

## 2024-04-05 ENCOUNTER — TELEPHONE (OUTPATIENT)
Dept: FAMILY MEDICINE CLINIC | Facility: CLINIC | Age: 74
End: 2024-04-05

## 2024-04-05 NOTE — TELEPHONE ENCOUNTER
----- Message from Larissa Menendez MD sent at 4/4/2024  7:59 AM EDT -----  Please let pt know that her cologuard testing is negative.  She'll be due in 3 years for repeat.    She does not have MyChart.

## 2024-07-24 ENCOUNTER — HOSPITAL ENCOUNTER (OUTPATIENT)
Dept: RADIOLOGY | Age: 74
Discharge: HOME | End: 2024-07-24
Payer: MEDICARE

## 2024-07-24 VITALS — WEIGHT: 123 LBS | HEIGHT: 64 IN | BODY MASS INDEX: 21 KG/M2

## 2024-07-24 DIAGNOSIS — M81.0 AGE-RELATED OSTEOPOROSIS WITHOUT CURRENT PATHOLOGICAL FRACTURE: ICD-10-CM

## 2024-07-24 DIAGNOSIS — Z78.0 POSTMENOPAUSE: ICD-10-CM

## 2024-07-24 DIAGNOSIS — Z12.31 ENCOUNTER FOR SCREENING MAMMOGRAM FOR BREAST CANCER: ICD-10-CM

## 2024-07-24 PROCEDURE — 77080 DXA BONE DENSITY AXIAL: CPT

## 2024-07-24 PROCEDURE — 77063 BREAST TOMOSYNTHESIS BI: CPT

## 2024-07-24 PROCEDURE — 77067 SCR MAMMO BI INCL CAD: CPT

## 2024-07-29 ENCOUNTER — TELEPHONE (OUTPATIENT)
Dept: FAMILY MEDICINE CLINIC | Facility: CLINIC | Age: 74
End: 2024-07-29

## 2024-07-29 NOTE — TELEPHONE ENCOUNTER
----- Message from Larissa Menendez MD sent at 7/28/2024  9:55 AM EDT -----  Let pt know that I received her dexa scan results. While she is still in the range of osteoporosis, her bone density is better than previous.  We can review it in detail at her next office visit.  She still would meet the criteria to treat the osteoporosis with medication.  If she'd like a referral for this (we've discussed Prolia in the past) I can put it in.  Otherwise we can talk more at her visit.

## 2024-07-29 NOTE — TELEPHONE ENCOUNTER
I contacted patient to discuss DXA results and go over Prolia option as treatment per Dr. Menendez. Patient would like to just continue her current treatment and declines Prolia. Patient verbalizes understanding and would like to discuss results further with PCP at next appointment.

## 2024-11-06 ENCOUNTER — TELEPHONE (OUTPATIENT)
Age: 74
End: 2024-11-06

## 2024-11-06 NOTE — TELEPHONE ENCOUNTER
Patient called having cataract surgery on 12/6/2024 for right eye. Is going out of town 11/7/2024 and will return on 11/12/2024, schedule has no availability, Please review and call patient with appointment

## 2024-11-06 NOTE — TELEPHONE ENCOUNTER
Name of procedure: cataract surgery(right eye)     Diagnosis:cataract     Date of procedure (within 30 days): 12/6/24     Surgeon: Dr Gwen Robles     Location of procedure (hospital or out patient facility name): [facility] PATs date/location/ type (Which labs? EKG? CXR?): no testing     Records (on epic or requested):     Type of anaesthesia:  mac     Paperwork to complete for Pre-op (patient bringing vs. calling office to obtain prior to appointment): yes bringing paperwork on visit     Pre-op appointment scheduled (date/time): 11/12/24 1:00pm

## 2024-11-13 ENCOUNTER — TELEPHONE (OUTPATIENT)
Dept: ADMINISTRATIVE | Facility: OTHER | Age: 74
End: 2024-11-13

## 2024-11-13 NOTE — PROGRESS NOTES
Pre-operative Clearance  Name: Bettnia Parkinson      : 1950      MRN: [id number]  Encounter Provider: Cammy Flor DO  Encounter Date: 2024   Encounter department: [facility]    Assessment & Plan  Pre-op examination         Pending R Cataract surgery per Optho.    Cataract of right eye, unspecified cataract type           Pending R Cataract surgery per Optho.    Post-operative nausea and vomiting           Pending R Cataract surgery per Optho.    Pre-operative Clearance:     Revised Cardiac Risk Index:  RCI RISK CLASS I (0 risk factors, risk of major cardiac complications approximately 0.5%)    Clearance:  Patient is medically optimized (CLEARED) for proposed surgery without any additional cardiac testing.      Medication Instructions:   - Avoid herbs or non-directed vitamins one week prior to surgery         History of Present Illness     Pre-op Exam    Name of procedure: cataract surgery(right eye)     Diagnosis:cataract     Date of procedure (within 30 days): 24     Surgeon: Dr Gwen Robles     Location of procedure (hospital or out patient facility name): [facility]      Type of anaesthesia:  mac    RCRI  High Risk surgery?         1 Point  CAD History:         1 Point   MI; Positive Stress Test; CP due to Mi;  Nitrate Usage to control Angina; Pathologic Q wave on EKG  CHF Active:         1 Point   Pulm Edema; Paroxysmal Nocturnal Dyspnea;  Bibasilar Rales (crackles);S3; CHF on CXR  Cerebrovascular Disease (TIA or CVA):     1 Point  DM on Insulin:        1 Point  Serum Creat >2.0 mg/dl:       1 Point          Total Points: 0     Scorin: Class I, Very Low Risk (0.4%)     1: Class II, Low risk (0.9%)     2: Class III Moderate (6.6%)     3: Class IV High (>11%)     Has post-op nausea and vomiting.        Previous history of bleeding disorders or clots?: No  Previous Anesthesia reaction?: Yes  Prolonged steroid use in the last 6  months?: No    Pre-operative Risk Factors:  Elevated-risk surgery: No    History of cerebrovascular disease: No    History of ischemic heart disease: No  Pre-operative treatment with insulin: No  Pre-operative creatinine >2 mg/dL: No    History of congestive heart failure: No    Review of Systems   Constitutional:  Negative for appetite change, chills, diaphoresis, fatigue and fever.   Respiratory:  Negative for chest tightness and shortness of breath.    Cardiovascular:  Negative for chest pain.   Gastrointestinal:  Negative for abdominal pain, blood in stool, diarrhea, nausea and vomiting.   Genitourinary:  Negative for dysuria.     Past Medical History   Past Medical History:   Diagnosis Date    Allergic     Noted in chart    Anxiety Noted in chart    Feeling better    Arthritis Noted in chart    De Quervain's tenosynovitis     Osteoporosis     Puckering of macula, right eye     Retinal tear of right eye     Visual impairment Noted in chart    Tear in retina/repair then surgery for retinal pucker     Past Surgical History:   Procedure Laterality Date    EYE SURGERY      HYSTERECTOMY  12/14/2020    RETINAL DETACHMENT SURGERY      TONSILLECTOMY      TUBAL LIGATION      WRIST SURGERY       Family History   Problem Relation Age of Onset    COPD Mother     Coronary artery disease Mother     Hypertension Mother     Osteoporosis Mother     Emphysema Father     Antithrombin III deficiency Brother     Diverticulitis Brother     Hypertension Brother     No Known Problems Sister     No Known Problems Daughter     No Known Problems Maternal Grandmother     No Known Problems Maternal Grandfather     No Known Problems Paternal Grandmother     No Known Problems Paternal Grandfather     Colon cancer Paternal Uncle     Breast cancer Paternal Aunt 80    No Known Problems Paternal Aunt     No Known Problems Paternal Aunt     No Known Problems Paternal Aunt     No Known Problems Paternal Aunt     No Known Problems Paternal Aunt      "No Known Problems Paternal Aunt     No Known Problems Paternal Aunt     No Known Problems Maternal Aunt     No Known Problems Maternal Aunt      Social History     Tobacco Use    Smoking status: Never     Passive exposure: Never    Smokeless tobacco: Never   Vaping Use    Vaping status: Never Used   Substance and Sexual Activity    Alcohol use: Yes    Drug use: Never    Sexual activity: Not Currently     Current Outpatient Medications on File Prior to Visit   Medication Sig    Ascorbic Acid (VITAMIN C) 100 MG tablet Take 500 mg by mouth 2 (two) times a day    cholecalciferol (VITAMIN D3) 1,000 units tablet Take 1,000 Units by mouth daily    ferrous sulfate 325 (65 Fe) mg tablet Take 1 tablet (325 mg total) by mouth 3 (three) times a week (Patient taking differently: Take 325 mg by mouth 2 (two) times a week)    Multiple Vitamins-Minerals (Algae Based Calcium) TABS Take 2 capsules by mouth 2 (two) times a day (Calcium 360mg, Magnesium 175mg, Vitamin C 25mg, Vitamin D 800IU) (Stronium Boost 680mg qhs)     Allergies   Allergen Reactions    Alendronate Abdominal Pain     Fosamax    Penicillins Rash     Objective   /80   Pulse 62   Temp 97.6 °F (36.4 °C)   Resp 14   Ht 5' 4\" (1.626 m)   Wt 55.8 kg (123 lb)   SpO2 98%   BMI 21.11 kg/m²     Physical Exam  Vitals and nursing note reviewed.   Constitutional:       General: She is not in acute distress.     Appearance: Normal appearance. She is well-developed and normal weight.   HENT:      Head: Normocephalic and atraumatic.      Right Ear: Tympanic membrane, ear canal and external ear normal.      Left Ear: Tympanic membrane and external ear normal.      Nose: Nose normal.      Mouth/Throat:      Mouth: Mucous membranes are moist.      Pharynx: Oropharynx is clear.   Eyes:      Extraocular Movements: Extraocular movements intact.      Conjunctiva/sclera: Conjunctivae normal.      Pupils: Pupils are equal, round, and reactive to light.   Cardiovascular:      " Rate and Rhythm: Normal rate and regular rhythm.      Pulses: Normal pulses.      Heart sounds: Normal heart sounds. No murmur heard.  Pulmonary:      Effort: Pulmonary effort is normal. No respiratory distress.      Breath sounds: Normal breath sounds.   Abdominal:      General: Abdomen is flat. Bowel sounds are normal. There is no distension.      Palpations: Abdomen is soft. There is no mass.      Tenderness: There is no abdominal tenderness. There is no guarding or rebound.   Musculoskeletal:         General: No swelling.      Cervical back: Neck supple.      Right lower leg: No edema.      Left lower leg: No edema.   Lymphadenopathy:      Cervical: No cervical adenopathy.   Skin:     General: Skin is warm and dry.   Neurological:      General: No focal deficit present.      Mental Status: She is alert and oriented to person, place, and time.   Psychiatric:         Mood and Affect: Mood normal.           Cammy Flor DO    5 minutes spent on chart prep, 35 minutes spent with patient counseling/educating on their diagnoses, tests completed and any new tests ordered, any referrals placed, treatment options, and documentation of above today.   In prescribing new medications, or changing doses, we reviewed the risks and benefits and side effects of these medications along with other treatment options if appropriate.

## 2024-11-13 NOTE — TELEPHONE ENCOUNTER
11/13/24 1:12 PM    Patient contacted to bring Advance Directive, POLST, or Living Will document to next scheduled pcp visit.VBI Department left message.    Thank you.  Magda Christianson MA  [facility]

## 2024-11-13 NOTE — PATIENT INSTRUCTIONS
Pre-operative Medication Instructions    Avoid herbs or non-directed vitamins one week prior to surgery  Avoid aspirin containing medications or non-steroidal anti-inflammatory drugs one week preceding surgery  May take tylenol for pain up until the night before surgery

## 2024-11-14 ENCOUNTER — CONSULT (OUTPATIENT)
Dept: FAMILY MEDICINE CLINIC | Facility: CLINIC | Age: 74
End: 2024-11-14
Payer: MEDICARE

## 2024-11-14 VITALS
SYSTOLIC BLOOD PRESSURE: 138 MMHG | OXYGEN SATURATION: 98 % | DIASTOLIC BLOOD PRESSURE: 80 MMHG | BODY MASS INDEX: 21 KG/M2 | HEIGHT: 64 IN | TEMPERATURE: 97.6 F | WEIGHT: 123 LBS | HEART RATE: 62 BPM | RESPIRATION RATE: 14 BRPM

## 2024-11-14 DIAGNOSIS — Z01.818 PRE-OP EXAMINATION: Primary | ICD-10-CM

## 2024-11-14 DIAGNOSIS — H26.9 CATARACT OF RIGHT EYE, UNSPECIFIED CATARACT TYPE: ICD-10-CM

## 2024-11-14 DIAGNOSIS — Z98.890 POST-OPERATIVE NAUSEA AND VOMITING: ICD-10-CM

## 2024-11-14 DIAGNOSIS — R11.2 POST-OPERATIVE NAUSEA AND VOMITING: ICD-10-CM

## 2024-11-14 PROCEDURE — G2211 COMPLEX E/M VISIT ADD ON: HCPCS | Performed by: FAMILY MEDICINE

## 2024-11-14 PROCEDURE — 99215 OFFICE O/P EST HI 40 MIN: CPT | Performed by: FAMILY MEDICINE

## 2024-11-14 RX ORDER — VITAMIN B COMPLEX
1 CAPSULE ORAL DAILY
COMMUNITY

## 2024-11-14 RX ORDER — AMOXICILLIN 500 MG
1200 CAPSULE ORAL DAILY
COMMUNITY

## 2024-11-14 RX ORDER — CALCIUM CARBONATE 300MG(750)
400 TABLET,CHEWABLE ORAL DAILY
COMMUNITY

## 2024-11-15 ENCOUNTER — TELEPHONE (OUTPATIENT)
Dept: ADMINISTRATIVE | Facility: OTHER | Age: 74
End: 2024-11-15

## 2024-11-21 ENCOUNTER — TELEPHONE (OUTPATIENT)
Dept: ADMINISTRATIVE | Facility: OTHER | Age: 74
End: 2024-11-21

## 2024-11-21 NOTE — TELEPHONE ENCOUNTER
----- Message from Chiquita CUELLO sent at 11/20/2024 10:43 AM EST -----  Regarding: care gap request  11/20/24 10:43 AM    Hello, our patient attached above has had CRC: Cologuard completed/performed. Please assist in updating the patient chart by pulling the document from Lab Tab within Chart Review. The date of service is 03/29/2024.     Thank you,  Chiquita Nayak [facility]

## 2024-11-21 NOTE — TELEPHONE ENCOUNTER
Upon review of the In Basket request we were able to note that no further action is required. The patient chart is up to date.      Any additional questions or concerns should be emailed to the Practice Liaisons via the appropriate education email address, please do not reply via In Basket.    Thank you  Alberto Cedeno MA   [facility]

## 2024-12-04 ENCOUNTER — RA CDI HCC (OUTPATIENT)
Dept: OTHER | Facility: HOSPITAL | Age: 74
End: 2024-12-04

## 2024-12-04 ENCOUNTER — VBI (OUTPATIENT)
Dept: ADMINISTRATIVE | Facility: OTHER | Age: 74
End: 2024-12-04

## 2024-12-04 NOTE — TELEPHONE ENCOUNTER
12/04/24 9:02 AM    Patient contacted to bring Advance Directive, POLST, or Living Will document to next scheduled pcp visit.[facility] Department left message.    Thank you.  ELVIN FISCHER MA  [facility]

## 2024-12-10 ENCOUNTER — OFFICE VISIT (OUTPATIENT)
Dept: FAMILY MEDICINE CLINIC | Facility: CLINIC | Age: 74
End: 2024-12-10
Payer: MEDICARE

## 2024-12-10 VITALS
SYSTOLIC BLOOD PRESSURE: 112 MMHG | HEIGHT: 64 IN | WEIGHT: 123 LBS | HEART RATE: 61 BPM | DIASTOLIC BLOOD PRESSURE: 62 MMHG | OXYGEN SATURATION: 97 % | BODY MASS INDEX: 21 KG/M2 | TEMPERATURE: 97.8 F

## 2024-12-10 DIAGNOSIS — Z00.00 MEDICARE ANNUAL WELLNESS VISIT, SUBSEQUENT: Primary | ICD-10-CM

## 2024-12-10 DIAGNOSIS — M25.50 MULTIPLE JOINT PAIN: ICD-10-CM

## 2024-12-10 DIAGNOSIS — M81.0 AGE-RELATED OSTEOPOROSIS WITHOUT CURRENT PATHOLOGICAL FRACTURE: ICD-10-CM

## 2024-12-10 DIAGNOSIS — Z13.220 SCREENING CHOLESTEROL LEVEL: ICD-10-CM

## 2024-12-10 DIAGNOSIS — M81.8 OTHER OSTEOPOROSIS WITHOUT CURRENT PATHOLOGICAL FRACTURE: ICD-10-CM

## 2024-12-10 DIAGNOSIS — Z23 ENCOUNTER FOR IMMUNIZATION: ICD-10-CM

## 2024-12-10 PROCEDURE — 90677 PCV20 VACCINE IM: CPT | Performed by: FAMILY MEDICINE

## 2024-12-10 PROCEDURE — G0439 PPPS, SUBSEQ VISIT: HCPCS | Performed by: FAMILY MEDICINE

## 2024-12-10 PROCEDURE — 99213 OFFICE O/P EST LOW 20 MIN: CPT | Performed by: FAMILY MEDICINE

## 2024-12-10 PROCEDURE — 90471 IMMUNIZATION ADMIN: CPT | Performed by: FAMILY MEDICINE

## 2024-12-10 NOTE — ASSESSMENT & PLAN NOTE
Most recent with improvement, no meds.  Orders:  •  CBC and differential; Future  •  TSH, 3rd generation with Free T4 reflex; Future

## 2024-12-10 NOTE — PROGRESS NOTES
Name: Bettina Parkinson      : 1950      MRN: [id number]  Encounter Provider: Larissa Menendez MD  Encounter Date: 12/10/2024   Encounter department: [facility]    Assessment & Plan  Medicare annual wellness visit, subsequent  See plan below       Age-related osteoporosis without current pathological fracture  Most recent with improvement, no meds.  Orders:  •  CBC and differential; Future  •  TSH, 3rd generation with Free T4 reflex; Future    Multiple joint pain  Cbc w next labs  She will let me know if the pain is more persistent or bothersome.    Will try to pay attention to triggers  Orders:  •  CBC and differential; Future    Other osteoporosis without current pathological fracture  Reviewed last dexa, which showed improvement.  Discussed fall risk avoidance    Orders:  •  TSH, 3rd generation with Free T4 reflex; Future    Screening cholesterol level    Orders:  •  Comprehensive metabolic panel; Future  •  Lipid Panel with Direct LDL reflex; Future    Encounter for immunization    Orders:  •  Pneumococcal Conjugate Vaccine 20-valent (Pcv20)       Orders Placed This Encounter   Procedures   • Pneumococcal Conjugate Vaccine 20-valent (Pcv20)   • CBC and differential   • Comprehensive metabolic panel   • Lipid Panel with Direct LDL reflex   • TSH, 3rd generation with Free T4 reflex          Preventive health issues were discussed with patient, and age appropriate screening tests were ordered as noted in patient's After Visit Summary. Personalized health advice and appropriate referrals for health education or preventive services given if needed, as noted in patient's After Visit Summary.  Chief Complaint   Patient presents with   • Medicare Wellness Visit     Pt c/o occasional sharp pain in her left hip and left knee.       History of Present Illness   Pt here for AWV  Occ gets a sharp pain in her L pain and L knee.  Pain lasts several minutes- once it goes away, she feels  fine.  Can't pinpoint a specific trigger.   The hip and knee don't hurt at the same time  Happened once in November.    Last dexa showed improvement in bone density  She prefers not to take medication at this point and will repeat the dex at two year intervals            Patient Care Team:  Larissa Menendez MD as PCP - General (Family Medicine)  Larissa Menendez MD as PCP - PCP-[facility] Attributed-Roster    Review of Systems  Annual Wellness Visit Questionnaire       Health Risk Assessment:   Patient rates overall health as very good. Patient feels that their physical health rating is same. Patient is very satisfied with their life. Eyesight was rated as slightly better. Hearing was rated as slightly worse. Patient feels that their emotional and mental health rating is same. Patients states they are sometimes angry. Patient states they are never, rarely unusually tired/fatigued. Pain experienced in the last 7 days has been none. Patient states that she has experienced no weight loss or gain in last 6 months.     Depression Screening:   PHQ-2 Score: 0      Fall Risk Screening:   In the past year, patient has experienced: no history of falling in past year      Urinary Incontinence Screening:   Patient has not leaked urine accidently in the last six months.     Home Safety:  Patient does not have trouble with stairs inside or outside of their home. Patient has working smoke alarms and has no working carbon monoxide detector. Home safety hazards include: not having non-slip bath and/or shower mats.     Nutrition:   Current diet is Regular.     Activities of Daily Living (ADLs)/Instrumental Activities of Daily Living (IADLs):   Walk and transfer into and out of bed and chair?: Yes  Dress and groom yourself?: Yes    Bathe or shower yourself?: Yes    Feed yourself? Yes  Do your laundry/housekeeping?: Yes  Manage your money, pay your bills and track your expenses?: Yes  Make your own meals?: Yes    Do your own  shopping?: Yes    Previous Hospitalizations:   Any hospitalizations or ED visits within the last 12 months?: No      Advance Care Planning:   Living will: Yes    Durable POA for healthcare: Yes    Advanced directive: Yes      Cognitive Screening:   Provider or family/friend/caregiver concerned regarding cognition?: No    PREVENTIVE SCREENINGS      Cardiovascular Screening:    General: Screening Current      Diabetes Screening:     General: Screening Current      Colorectal Cancer Screening:     General: Screening Current      Breast Cancer Screening:     General: Screening Current      Cervical Cancer Screening:    General: Screening Not Indicated      Osteoporosis Screening:    General: Screening Not Indicated and History Osteoporosis      Abdominal Aortic Aneurysm (AAA) Screening:        General: Risks and Benefits Discussed      Lung Cancer Screening:     General: Screening Not Indicated      Hepatitis C Screening:    General: Screening Current    Social Drivers of Health     Financial Resource Strain: Low Risk  (12/5/2023)    Overall Financial Resource Strain (CARDIA)    • Difficulty of Paying Living Expenses: Not hard at all   Food Insecurity: No Food Insecurity (12/10/2024)    Hunger Vital Sign    • Worried About Running Out of Food in the Last Year: Never true    • Ran Out of Food in the Last Year: Never true   Transportation Needs: No Transportation Needs (12/10/2024)    PRAPARE - Transportation    • Lack of Transportation (Medical): No    • Lack of Transportation (Non-Medical): No   Housing Stability: Low Risk  (12/10/2024)    Housing Stability Vital Sign    • Unable to Pay for Housing in the Last Year: No    • Number of Times Moved in the Last Year: 0    • Homeless in the Last Year: No   Utilities: Not At Risk (12/10/2024)    [facility] Utilities    • Threatened with loss of utilities: No     No results found.    Objective   /62 (Patient Position: Sitting, Cuff Size: Standard)   Pulse 61   Temp 97.8 °F  "(36.6 °C) (Temporal)   Ht 5' 3.5\" (1.613 m)   Wt 55.8 kg (123 lb)   SpO2 97%   BMI 21.45 kg/m²       Physical Exam  Vitals and nursing note reviewed.   Constitutional:       General: She is not in acute distress.     Appearance: Normal appearance. She is well-developed. She is not ill-appearing.   HENT:      Head: Normocephalic and atraumatic.   Eyes:      Conjunctiva/sclera: Conjunctivae normal.   Neck:      Vascular: No carotid bruit.   Cardiovascular:      Rate and Rhythm: Normal rate and regular rhythm.      Heart sounds: No murmur heard.  Pulmonary:      Effort: Pulmonary effort is normal. No respiratory distress.      Breath sounds: Normal breath sounds.   Abdominal:      Palpations: Abdomen is soft.      Tenderness: There is no abdominal tenderness.   Musculoskeletal:      Cervical back: Neck supple.      Right lower leg: No edema.      Left lower leg: No edema.   Lymphadenopathy:      Cervical: No cervical adenopathy.   Skin:     General: Skin is warm and dry.   Neurological:      Mental Status: She is alert and oriented to person, place, and time.      Gait: Gait normal.   Psychiatric:         Mood and Affect: Mood normal.         Behavior: Behavior normal.         "

## 2024-12-13 ENCOUNTER — APPOINTMENT (OUTPATIENT)
Dept: LAB | Facility: CLINIC | Age: 74
End: 2024-12-13
Payer: MEDICARE

## 2024-12-13 DIAGNOSIS — M81.0 AGE-RELATED OSTEOPOROSIS WITHOUT CURRENT PATHOLOGICAL FRACTURE: ICD-10-CM

## 2024-12-13 DIAGNOSIS — M81.8 OTHER OSTEOPOROSIS WITHOUT CURRENT PATHOLOGICAL FRACTURE: ICD-10-CM

## 2024-12-13 DIAGNOSIS — M25.50 MULTIPLE JOINT PAIN: ICD-10-CM

## 2024-12-13 DIAGNOSIS — Z13.220 SCREENING CHOLESTEROL LEVEL: ICD-10-CM

## 2024-12-13 LAB
ALBUMIN SERPL BCG-MCNC: 4.4 G/DL (ref 3.5–5)
ALP SERPL-CCNC: 80 U/L (ref 34–104)
ALT SERPL W P-5'-P-CCNC: 19 U/L (ref 7–52)
ANION GAP SERPL CALCULATED.3IONS-SCNC: 7 MMOL/L (ref 4–13)
AST SERPL W P-5'-P-CCNC: 21 U/L (ref 13–39)
BASOPHILS # BLD AUTO: 0.03 THOUSANDS/ΜL (ref 0–0.1)
BASOPHILS NFR BLD AUTO: 1 % (ref 0–1)
BILIRUB SERPL-MCNC: 1.17 MG/DL (ref 0.2–1)
BUN SERPL-MCNC: 20 MG/DL (ref 5–25)
CALCIUM SERPL-MCNC: 9.8 MG/DL (ref 8.4–10.2)
CHLORIDE SERPL-SCNC: 104 MMOL/L (ref 96–108)
CHOLEST SERPL-MCNC: 258 MG/DL (ref ?–200)
CO2 SERPL-SCNC: 31 MMOL/L (ref 21–32)
CREAT SERPL-MCNC: 0.81 MG/DL (ref 0.6–1.3)
EOSINOPHIL # BLD AUTO: 0.11 THOUSAND/ΜL (ref 0–0.61)
EOSINOPHIL NFR BLD AUTO: 2 % (ref 0–6)
ERYTHROCYTE [DISTWIDTH] IN BLOOD BY AUTOMATED COUNT: 13.1 % (ref 11.6–15.1)
GFR SERPL CREATININE-BSD FRML MDRD: 72 ML/MIN/1.73SQ M
GLUCOSE P FAST SERPL-MCNC: 86 MG/DL (ref 65–99)
HCT VFR BLD AUTO: 45 % (ref 34.8–46.1)
HDLC SERPL-MCNC: 112 MG/DL
HGB BLD-MCNC: 15.3 G/DL (ref 11.5–15.4)
IMM GRANULOCYTES # BLD AUTO: 0 THOUSAND/UL (ref 0–0.2)
IMM GRANULOCYTES NFR BLD AUTO: 0 % (ref 0–2)
LDLC SERPL CALC-MCNC: 133 MG/DL (ref 0–100)
LYMPHOCYTES # BLD AUTO: 1.56 THOUSANDS/ΜL (ref 0.6–4.47)
LYMPHOCYTES NFR BLD AUTO: 34 % (ref 14–44)
MCH RBC QN AUTO: 30.7 PG (ref 26.8–34.3)
MCHC RBC AUTO-ENTMCNC: 34 G/DL (ref 31.4–37.4)
MCV RBC AUTO: 90 FL (ref 82–98)
MONOCYTES # BLD AUTO: 0.34 THOUSAND/ΜL (ref 0.17–1.22)
MONOCYTES NFR BLD AUTO: 7 % (ref 4–12)
NEUTROPHILS # BLD AUTO: 2.58 THOUSANDS/ΜL (ref 1.85–7.62)
NEUTS SEG NFR BLD AUTO: 56 % (ref 43–75)
NRBC BLD AUTO-RTO: 0 /100 WBCS
PLATELET # BLD AUTO: 194 THOUSANDS/UL (ref 149–390)
PMV BLD AUTO: 10.2 FL (ref 8.9–12.7)
POTASSIUM SERPL-SCNC: 4.2 MMOL/L (ref 3.5–5.3)
PROT SERPL-MCNC: 6.6 G/DL (ref 6.4–8.4)
RBC # BLD AUTO: 4.99 MILLION/UL (ref 3.81–5.12)
SODIUM SERPL-SCNC: 142 MMOL/L (ref 135–147)
TRIGL SERPL-MCNC: 63 MG/DL (ref ?–150)
TSH SERPL DL<=0.05 MIU/L-ACNC: 2.02 UIU/ML (ref 0.45–4.5)
WBC # BLD AUTO: 4.62 THOUSAND/UL (ref 4.31–10.16)

## 2024-12-13 PROCEDURE — 36415 COLL VENOUS BLD VENIPUNCTURE: CPT

## 2024-12-13 PROCEDURE — 84443 ASSAY THYROID STIM HORMONE: CPT

## 2024-12-13 PROCEDURE — 80053 COMPREHEN METABOLIC PANEL: CPT

## 2024-12-13 PROCEDURE — 85025 COMPLETE CBC W/AUTO DIFF WBC: CPT

## 2024-12-13 PROCEDURE — 80061 LIPID PANEL: CPT

## 2024-12-17 ENCOUNTER — RESULTS FOLLOW-UP (OUTPATIENT)
Dept: FAMILY MEDICINE CLINIC | Facility: CLINIC | Age: 74
End: 2024-12-17

## 2024-12-18 NOTE — TELEPHONE ENCOUNTER
Pt called in, relayed message from Dr. Menendez to her. She understood. She is requesting the results be mailed to her.

## 2025-01-16 ENCOUNTER — OFFICE VISIT (OUTPATIENT)
Dept: FAMILY MEDICINE CLINIC | Facility: CLINIC | Age: 75
End: 2025-01-16
Payer: MEDICARE

## 2025-01-16 VITALS
BODY MASS INDEX: 20.69 KG/M2 | RESPIRATION RATE: 14 BRPM | TEMPERATURE: 97.3 F | SYSTOLIC BLOOD PRESSURE: 134 MMHG | HEIGHT: 64 IN | OXYGEN SATURATION: 98 % | WEIGHT: 121.2 LBS | DIASTOLIC BLOOD PRESSURE: 62 MMHG | HEART RATE: 73 BPM

## 2025-01-16 DIAGNOSIS — R19.7 DIARRHEA, UNSPECIFIED TYPE: Primary | ICD-10-CM

## 2025-01-16 PROCEDURE — G2211 COMPLEX E/M VISIT ADD ON: HCPCS | Performed by: FAMILY MEDICINE

## 2025-01-16 PROCEDURE — 99214 OFFICE O/P EST MOD 30 MIN: CPT | Performed by: FAMILY MEDICINE

## 2025-01-17 NOTE — PROGRESS NOTES
Assessment & Plan      Assessment & Plan  Diarrhea, unspecified type  Diarrhea  - Likely etiology: viral   - No signs of acute illness (pain, fever, body aches)  - Maintaining adequate hydration  - Discussed avoidance of dairy for now  - Advised to hold all vitamins until diarrhea subsides more    - OK to use Imodium: one tablet daily to twice daily as long as she tolerates this ok.    - If abdominal cramps occur, discontinue and inform the office.  - Encouraged to continue bland diet, BRAT  - Maintain hydration with electrolyte drinks  - If symptoms persist or worsen, pt to reach out.                  Subjective:     Bettina is a 74 y.o. female here today and has the below chronic conditions:    Patient Active Problem List   Diagnosis   • Age related osteoporosis   • Osteoarthrosis, hand   • Achenbach's syndrome   • Anxiety     Current Outpatient Medications   Medication Sig Dispense Refill   • Ascorbic Acid (VITAMIN C) 100 MG tablet Take 500 mg by mouth 2 (two) times a day     • b complex vitamins capsule Take 1 capsule by mouth daily     • cholecalciferol (VITAMIN D3) 1,000 units tablet Take 1,000 Units by mouth daily     • ferrous sulfate 325 (65 Fe) mg tablet Take 1 tablet (325 mg total) by mouth 3 (three) times a week (Patient taking differently: Take 325 mg by mouth once a week)     • Magnesium 400 MG TABS Take 400 mg by mouth in the morning     • Multiple Vitamins-Minerals (Algae Based Calcium) TABS Take 2 capsules by mouth 2 (two) times a day (Calcium 360mg, Magnesium 175mg, Vitamin C 25mg, Vitamin D 800IU) (Stronium Boost 680mg qhs)     • Omega-3 Fatty Acids (Fish Oil) 1200 MG CAPS Take 1,200 mg by mouth in the morning       No current facility-administered medications for this visit.          Chief Complaint   Patient presents with   • Diarrhea     Diarrhea for the last week. Denies abd pain, blood in stool, n/v, and fevers. All BM were diarrhea initially, improving, but not resolving. Doing BRAT diet  "at this time. Would like to know if she should or can still take her vitamins when she is having diarrhea.      HPI:  - CC above per clinical staff and reviewed.    History of Present Illness  The patient presents for evaluation of diarrhea.    Diarrhea  - Began experiencing diarrhea a week ago, initially with each bathroom visit.  - Severity has since decreased, but the condition persists.  - Bowel movements are typically once daily, characterized by soft, formed stools.  - Currently experiencing explosive, watery stools without nausea, fever, vomiting, or blood.  - Current symptoms include loose stools, with some water content, occurring 6 to 7 times daily, including at night.  - Reports no cramping or pain during these episodes.    Ache and Tingling Sensation  - On Sunday night, had an ache across her saddle area, extending to her legs, disrupting her sleep.  - Acetaminophen alleviated the pain.  - Describes the sensation as tingling.    Supplemental information: Maintaining hydration with Smartwater and decaffeinated tea and adheres to a bland diet, including applesauce, creamy peanut butter, saltine crackers, plain bagels, baked chicken, salmon, basmati rice, pretzels, rolls, and bananas. She has abstained from dairy products and has not taken any antibiotics recently. Recalls consuming a greasy burger two weeks ago and notes her only recent outing was to a hairdresser on Tuesday. No known exposure to illness.  Manages her symptoms with the BRAT diet, which has provided some relief.        The following portions of the patient's history were reviewed and updated as appropriate: allergies, current medications, past family history, past medical history, past social history, past surgical history and problem list.    ROS:  Review of Systems   As noted above.    Objective:      /62   Pulse 73   Temp (!) 97.3 °F (36.3 °C) (Temporal)   Resp 14   Ht 5' 3.5\" (1.613 m)   Wt 55 kg (121 lb 3.2 oz)   SpO2 98%  "  BMI 21.13 kg/m²   BP Readings from Last 3 Encounters:   01/16/25 134/62   12/10/24 112/62   11/14/24 138/80     Wt Readings from Last 3 Encounters:   01/16/25 55 kg (121 lb 3.2 oz)   12/10/24 55.8 kg (123 lb)   11/14/24 55.8 kg (123 lb)               Physical Exam:   Physical Exam  Vitals and nursing note reviewed.   Constitutional:       General: She is not in acute distress.     Appearance: Normal appearance.   HENT:      Mouth/Throat:      Mouth: Mucous membranes are moist.   Cardiovascular:      Rate and Rhythm: Normal rate and regular rhythm.      Heart sounds: No murmur heard.  Pulmonary:      Effort: Pulmonary effort is normal.      Breath sounds: Normal breath sounds.   Abdominal:      General: There is no distension.      Palpations: Abdomen is soft.      Tenderness: There is no abdominal tenderness. There is no guarding or rebound.      Comments: Hyperactive bowel sounds   Musculoskeletal:      Right lower leg: No edema.      Left lower leg: Edema present.   Lymphadenopathy:      Cervical: No cervical adenopathy.   Neurological:      Mental Status: She is alert.   Psychiatric:         Mood and Affect: Mood normal.         Behavior: Behavior normal.              This office visit note was generated in part with the use of [employer] and/or voice recognition dictation.